# Patient Record
Sex: MALE | Race: ASIAN | NOT HISPANIC OR LATINO | URBAN - METROPOLITAN AREA
[De-identification: names, ages, dates, MRNs, and addresses within clinical notes are randomized per-mention and may not be internally consistent; named-entity substitution may affect disease eponyms.]

---

## 2021-01-01 ENCOUNTER — INPATIENT (INPATIENT)
Facility: HOSPITAL | Age: 86
LOS: 1 days | DRG: 871 | End: 2021-03-03
Attending: INTERNAL MEDICINE | Admitting: INTERNAL MEDICINE
Payer: MEDICARE

## 2021-01-01 VITALS
RESPIRATION RATE: 18 BRPM | HEART RATE: 136 BPM | OXYGEN SATURATION: 76 % | SYSTOLIC BLOOD PRESSURE: 118 MMHG | DIASTOLIC BLOOD PRESSURE: 80 MMHG | WEIGHT: 176.37 LBS

## 2021-01-01 VITALS — HEART RATE: 105 BPM | OXYGEN SATURATION: 100 %

## 2021-01-01 DIAGNOSIS — J96.90 RESPIRATORY FAILURE, UNSPECIFIED, UNSPECIFIED WHETHER WITH HYPOXIA OR HYPERCAPNIA: ICD-10-CM

## 2021-01-01 DIAGNOSIS — E43 UNSPECIFIED SEVERE PROTEIN-CALORIE MALNUTRITION: ICD-10-CM

## 2021-01-01 DIAGNOSIS — Z95.1 PRESENCE OF AORTOCORONARY BYPASS GRAFT: Chronic | ICD-10-CM

## 2021-01-01 DIAGNOSIS — I50.9 HEART FAILURE, UNSPECIFIED: ICD-10-CM

## 2021-01-01 DIAGNOSIS — U07.1 COVID-19: ICD-10-CM

## 2021-01-01 DIAGNOSIS — R53.81 OTHER MALAISE: ICD-10-CM

## 2021-01-01 DIAGNOSIS — Z51.5 ENCOUNTER FOR PALLIATIVE CARE: ICD-10-CM

## 2021-01-01 DIAGNOSIS — I63.9 CEREBRAL INFARCTION, UNSPECIFIED: ICD-10-CM

## 2021-01-01 DIAGNOSIS — A41.9 SEPSIS, UNSPECIFIED ORGANISM: ICD-10-CM

## 2021-01-01 LAB
24R-OH-CALCIDIOL SERPL-MCNC: 37.9 NG/ML — SIGNIFICANT CHANGE UP (ref 30–80)
A1C WITH ESTIMATED AVERAGE GLUCOSE RESULT: 6.2 % — HIGH (ref 4–5.6)
ALBUMIN SERPL ELPH-MCNC: 1.7 G/DL — LOW (ref 3.5–5)
ALBUMIN SERPL ELPH-MCNC: 1.8 G/DL — LOW (ref 3.5–5)
ALBUMIN SERPL ELPH-MCNC: 1.8 G/DL — LOW (ref 3.5–5)
ALBUMIN SERPL ELPH-MCNC: 2.3 G/DL — LOW (ref 3.5–5)
ALP SERPL-CCNC: 104 U/L — SIGNIFICANT CHANGE UP (ref 40–120)
ALP SERPL-CCNC: 110 U/L — SIGNIFICANT CHANGE UP (ref 40–120)
ALP SERPL-CCNC: 115 U/L — SIGNIFICANT CHANGE UP (ref 40–120)
ALP SERPL-CCNC: 122 U/L — HIGH (ref 40–120)
ALP SERPL-CCNC: 147 U/L — HIGH (ref 40–120)
ALP SERPL-CCNC: 99 U/L — SIGNIFICANT CHANGE UP (ref 40–120)
ALT FLD-CCNC: 179 U/L DA — HIGH (ref 10–60)
ALT FLD-CCNC: 392 U/L DA — HIGH (ref 10–60)
ALT FLD-CCNC: 570 U/L DA — HIGH (ref 10–60)
ALT FLD-CCNC: 596 U/L DA — HIGH (ref 10–60)
ALT FLD-CCNC: 671 U/L DA — HIGH (ref 10–60)
ALT FLD-CCNC: 684 U/L DA — HIGH (ref 10–60)
ANION GAP SERPL CALC-SCNC: 11 MMOL/L — SIGNIFICANT CHANGE UP (ref 5–17)
ANION GAP SERPL CALC-SCNC: 19 MMOL/L — HIGH (ref 5–17)
ANION GAP SERPL CALC-SCNC: 9 MMOL/L — SIGNIFICANT CHANGE UP (ref 5–17)
ANION GAP SERPL CALC-SCNC: 9 MMOL/L — SIGNIFICANT CHANGE UP (ref 5–17)
APPEARANCE UR: CLEAR — SIGNIFICANT CHANGE UP
APTT BLD: 32.6 SEC — SIGNIFICANT CHANGE UP (ref 27.5–35.5)
APTT BLD: >200 SEC — CRITICAL HIGH (ref 27.5–35.5)
AST SERPL-CCNC: 1013 U/L — HIGH (ref 10–40)
AST SERPL-CCNC: 1480 U/L — HIGH (ref 10–40)
AST SERPL-CCNC: 1499 U/L — HIGH (ref 10–40)
AST SERPL-CCNC: 1977 U/L — HIGH (ref 10–40)
AST SERPL-CCNC: 2000 U/L — HIGH (ref 10–40)
AST SERPL-CCNC: 506 U/L — HIGH (ref 10–40)
BASE EXCESS BLDA CALC-SCNC: -6.6 MMOL/L — LOW (ref -2–2)
BASE EXCESS BLDA CALC-SCNC: -7.4 MMOL/L — LOW (ref -2–2)
BASE EXCESS BLDA CALC-SCNC: -8.7 MMOL/L — LOW (ref -2–2)
BASE EXCESS BLDV CALC-SCNC: -5 MMOL/L — LOW (ref -2–2)
BASOPHILS # BLD AUTO: 0 K/UL — SIGNIFICANT CHANGE UP (ref 0–0.2)
BASOPHILS # BLD AUTO: 0.03 K/UL — SIGNIFICANT CHANGE UP (ref 0–0.2)
BASOPHILS # BLD AUTO: 0.04 K/UL — SIGNIFICANT CHANGE UP (ref 0–0.2)
BASOPHILS NFR BLD AUTO: 0 % — SIGNIFICANT CHANGE UP (ref 0–2)
BASOPHILS NFR BLD AUTO: 0.2 % — SIGNIFICANT CHANGE UP (ref 0–2)
BASOPHILS NFR BLD AUTO: 0.3 % — SIGNIFICANT CHANGE UP (ref 0–2)
BILIRUB DIRECT SERPL-MCNC: 0.9 MG/DL — HIGH (ref 0–0.2)
BILIRUB DIRECT SERPL-MCNC: 1.7 MG/DL — HIGH (ref 0–0.2)
BILIRUB INDIRECT FLD-MCNC: 0.7 MG/DL — SIGNIFICANT CHANGE UP (ref 0.2–1)
BILIRUB INDIRECT FLD-MCNC: 1.2 MG/DL — HIGH (ref 0.2–1)
BILIRUB SERPL-MCNC: 1.5 MG/DL — HIGH (ref 0.2–1.2)
BILIRUB SERPL-MCNC: 1.6 MG/DL — HIGH (ref 0.2–1.2)
BILIRUB SERPL-MCNC: 1.6 MG/DL — HIGH (ref 0.2–1.2)
BILIRUB SERPL-MCNC: 2 MG/DL — HIGH (ref 0.2–1.2)
BILIRUB SERPL-MCNC: 2.3 MG/DL — HIGH (ref 0.2–1.2)
BILIRUB SERPL-MCNC: 2.9 MG/DL — HIGH (ref 0.2–1.2)
BILIRUB SERPL-MCNC: 2.9 MG/DL — HIGH (ref 0.2–1.2)
BILIRUB SERPL-MCNC: 4.4 MG/DL — HIGH (ref 0.2–1.2)
BILIRUB UR-MCNC: NEGATIVE — SIGNIFICANT CHANGE UP
BLOOD GAS COMMENTS ARTERIAL: SIGNIFICANT CHANGE UP
BLOOD GAS COMMENTS, VENOUS: SIGNIFICANT CHANGE UP
BLOOD GAS COMMENTS, VENOUS: SIGNIFICANT CHANGE UP
BUN SERPL-MCNC: 69 MG/DL — HIGH (ref 7–18)
BUN SERPL-MCNC: 77 MG/DL — HIGH (ref 7–18)
BUN SERPL-MCNC: 81 MG/DL — HIGH (ref 7–18)
BUN SERPL-MCNC: 81 MG/DL — HIGH (ref 7–18)
BUN SERPL-MCNC: 91 MG/DL — HIGH (ref 7–18)
BUN SERPL-MCNC: 97 MG/DL — HIGH (ref 7–18)
CALCIUM SERPL-MCNC: 7.2 MG/DL — LOW (ref 8.4–10.5)
CALCIUM SERPL-MCNC: 7.3 MG/DL — LOW (ref 8.4–10.5)
CALCIUM SERPL-MCNC: 7.4 MG/DL — LOW (ref 8.4–10.5)
CALCIUM SERPL-MCNC: 7.5 MG/DL — LOW (ref 8.4–10.5)
CALCIUM SERPL-MCNC: 7.6 MG/DL — LOW (ref 8.4–10.5)
CALCIUM SERPL-MCNC: 8.6 MG/DL — SIGNIFICANT CHANGE UP (ref 8.4–10.5)
CHLORIDE SERPL-SCNC: 108 MMOL/L — SIGNIFICANT CHANGE UP (ref 96–108)
CHLORIDE SERPL-SCNC: 111 MMOL/L — HIGH (ref 96–108)
CHLORIDE SERPL-SCNC: 114 MMOL/L — HIGH (ref 96–108)
CHLORIDE SERPL-SCNC: 115 MMOL/L — HIGH (ref 96–108)
CHOLEST SERPL-MCNC: 67 MG/DL — SIGNIFICANT CHANGE UP
CK MB BLD-MCNC: 6.5 % — HIGH (ref 0–3.5)
CK MB BLD-MCNC: 6.9 % — HIGH (ref 0–3.5)
CK MB BLD-MCNC: 7.3 % — HIGH (ref 0–3.5)
CK MB CFR SERPL CALC: 102.2 NG/ML — HIGH (ref 0–3.6)
CK MB CFR SERPL CALC: 107.6 NG/ML — HIGH (ref 0–3.6)
CK MB CFR SERPL CALC: 97.7 NG/ML — HIGH (ref 0–3.6)
CK SERPL-CCNC: 1330 U/L — HIGH (ref 35–232)
CK SERPL-CCNC: 1489 U/L — HIGH (ref 35–232)
CK SERPL-CCNC: 1658 U/L — HIGH (ref 35–232)
CO2 SERPL-SCNC: 17 MMOL/L — LOW (ref 22–31)
CO2 SERPL-SCNC: 21 MMOL/L — LOW (ref 22–31)
COLOR SPEC: YELLOW — SIGNIFICANT CHANGE UP
CREAT ?TM UR-MCNC: 288 MG/DL — SIGNIFICANT CHANGE UP
CREAT SERPL-MCNC: 3.55 MG/DL — HIGH (ref 0.5–1.3)
CREAT SERPL-MCNC: 4.14 MG/DL — HIGH (ref 0.5–1.3)
CREAT SERPL-MCNC: 4.62 MG/DL — HIGH (ref 0.5–1.3)
CREAT SERPL-MCNC: 4.94 MG/DL — HIGH (ref 0.5–1.3)
CREAT SERPL-MCNC: 5.53 MG/DL — HIGH (ref 0.5–1.3)
CREAT SERPL-MCNC: 6.25 MG/DL — HIGH (ref 0.5–1.3)
CRP SERPL-MCNC: 187 MG/L — HIGH
CULTURE RESULTS: NO GROWTH — SIGNIFICANT CHANGE UP
D DIMER BLD IA.RAPID-MCNC: HIGH NG/ML DDU
D DIMER BLD IA.RAPID-MCNC: HIGH NG/ML DDU
DIFF PNL FLD: ABNORMAL
EOSINOPHIL # BLD AUTO: 0 K/UL — SIGNIFICANT CHANGE UP (ref 0–0.5)
EOSINOPHIL NFR BLD AUTO: 0 % — SIGNIFICANT CHANGE UP (ref 0–6)
ESTIMATED AVERAGE GLUCOSE: 131 MG/DL — HIGH (ref 68–114)
FERRITIN SERPL-MCNC: 2987 NG/ML — HIGH (ref 30–400)
FERRITIN SERPL-MCNC: 4479 NG/ML — HIGH (ref 30–400)
FIBRINOGEN PPP-MCNC: 217 MG/DL — LOW (ref 290–520)
FOLATE SERPL-MCNC: 14.9 NG/ML — SIGNIFICANT CHANGE UP
FSP PPP-MCNC: >=20 — SIGNIFICANT CHANGE UP
GLUCOSE SERPL-MCNC: 105 MG/DL — HIGH (ref 70–99)
GLUCOSE SERPL-MCNC: 143 MG/DL — HIGH (ref 70–99)
GLUCOSE SERPL-MCNC: 157 MG/DL — HIGH (ref 70–99)
GLUCOSE SERPL-MCNC: 158 MG/DL — HIGH (ref 70–99)
GLUCOSE SERPL-MCNC: 158 MG/DL — HIGH (ref 70–99)
GLUCOSE SERPL-MCNC: 218 MG/DL — HIGH (ref 70–99)
GLUCOSE UR QL: NEGATIVE — SIGNIFICANT CHANGE UP
HAV IGM SER-ACNC: SIGNIFICANT CHANGE UP
HBV CORE IGM SER-ACNC: SIGNIFICANT CHANGE UP
HBV SURFACE AG SER-ACNC: SIGNIFICANT CHANGE UP
HCO3 BLDA-SCNC: 14 MMOL/L — LOW (ref 23–27)
HCO3 BLDA-SCNC: 15 MMOL/L — LOW (ref 23–27)
HCO3 BLDA-SCNC: 18 MMOL/L — LOW (ref 23–27)
HCO3 BLDV-SCNC: 21 MMOL/L — SIGNIFICANT CHANGE UP (ref 21–29)
HCT VFR BLD CALC: 39.5 % — SIGNIFICANT CHANGE UP (ref 39–50)
HCT VFR BLD CALC: 40.4 % — SIGNIFICANT CHANGE UP (ref 39–50)
HCT VFR BLD CALC: 41.2 % — SIGNIFICANT CHANGE UP (ref 39–50)
HCT VFR BLD CALC: 41.6 % — SIGNIFICANT CHANGE UP (ref 39–50)
HCT VFR BLD CALC: 42.6 % — SIGNIFICANT CHANGE UP (ref 39–50)
HCT VFR BLD CALC: 47.9 % — SIGNIFICANT CHANGE UP (ref 39–50)
HCV AB S/CO SERPL IA: 0.14 S/CO — SIGNIFICANT CHANGE UP (ref 0–0.99)
HCV AB SERPL-IMP: SIGNIFICANT CHANGE UP
HDLC SERPL-MCNC: 20 MG/DL — LOW
HGB BLD-MCNC: 13 G/DL — SIGNIFICANT CHANGE UP (ref 13–17)
HGB BLD-MCNC: 13.1 G/DL — SIGNIFICANT CHANGE UP (ref 13–17)
HGB BLD-MCNC: 13.4 G/DL — SIGNIFICANT CHANGE UP (ref 13–17)
HGB BLD-MCNC: 13.5 G/DL — SIGNIFICANT CHANGE UP (ref 13–17)
HGB BLD-MCNC: 13.8 G/DL — SIGNIFICANT CHANGE UP (ref 13–17)
HGB BLD-MCNC: 16 G/DL — SIGNIFICANT CHANGE UP (ref 13–17)
HIV 1 & 2 AB SERPL IA.RAPID: SIGNIFICANT CHANGE UP
HOROWITZ INDEX BLDA+IHG-RTO: 100 — SIGNIFICANT CHANGE UP
HOROWITZ INDEX BLDV+IHG-RTO: 100 — SIGNIFICANT CHANGE UP
IMM GRANULOCYTES NFR BLD AUTO: 2.2 % — HIGH (ref 0–1.5)
IMM GRANULOCYTES NFR BLD AUTO: 2.4 % — HIGH (ref 0–1.5)
INR BLD: 1.28 RATIO — HIGH (ref 0.88–1.16)
INR BLD: 1.71 RATIO — HIGH (ref 0.88–1.16)
KETONES UR-MCNC: ABNORMAL
LACTATE SERPL-SCNC: 10.8 MMOL/L — CRITICAL HIGH (ref 0.7–2)
LACTATE SERPL-SCNC: 2.4 MMOL/L — HIGH (ref 0.7–2)
LACTATE SERPL-SCNC: 3 MMOL/L — HIGH (ref 0.7–2)
LACTATE SERPL-SCNC: 4 MMOL/L — CRITICAL HIGH (ref 0.7–2)
LACTATE SERPL-SCNC: 4.2 MMOL/L — CRITICAL HIGH (ref 0.7–2)
LACTATE SERPL-SCNC: 5.3 MMOL/L — CRITICAL HIGH (ref 0.7–2)
LEUKOCYTE ESTERASE UR-ACNC: ABNORMAL
LIPID PNL WITH DIRECT LDL SERPL: 16 MG/DL — SIGNIFICANT CHANGE UP
LYMPHOCYTES # BLD AUTO: 0.1 K/UL — LOW (ref 1–3.3)
LYMPHOCYTES # BLD AUTO: 1 % — LOW (ref 13–44)
LYMPHOCYTES # BLD AUTO: 1.07 K/UL — SIGNIFICANT CHANGE UP (ref 1–3.3)
LYMPHOCYTES # BLD AUTO: 1.3 K/UL — SIGNIFICANT CHANGE UP (ref 1–3.3)
LYMPHOCYTES # BLD AUTO: 10.2 % — LOW (ref 13–44)
LYMPHOCYTES # BLD AUTO: 7 % — LOW (ref 13–44)
MAGNESIUM SERPL-MCNC: 3 MG/DL — HIGH (ref 1.6–2.6)
MAGNESIUM SERPL-MCNC: 3.1 MG/DL — HIGH (ref 1.6–2.6)
MCHC RBC-ENTMCNC: 30.5 PG — SIGNIFICANT CHANGE UP (ref 27–34)
MCHC RBC-ENTMCNC: 30.7 PG — SIGNIFICANT CHANGE UP (ref 27–34)
MCHC RBC-ENTMCNC: 30.7 PG — SIGNIFICANT CHANGE UP (ref 27–34)
MCHC RBC-ENTMCNC: 30.8 PG — SIGNIFICANT CHANGE UP (ref 27–34)
MCHC RBC-ENTMCNC: 31.4 PG — SIGNIFICANT CHANGE UP (ref 27–34)
MCHC RBC-ENTMCNC: 32 PG — SIGNIFICANT CHANGE UP (ref 27–34)
MCHC RBC-ENTMCNC: 32.2 GM/DL — SIGNIFICANT CHANGE UP (ref 32–36)
MCHC RBC-ENTMCNC: 32.4 GM/DL — SIGNIFICANT CHANGE UP (ref 32–36)
MCHC RBC-ENTMCNC: 32.5 GM/DL — SIGNIFICANT CHANGE UP (ref 32–36)
MCHC RBC-ENTMCNC: 32.5 GM/DL — SIGNIFICANT CHANGE UP (ref 32–36)
MCHC RBC-ENTMCNC: 33.2 GM/DL — SIGNIFICANT CHANGE UP (ref 32–36)
MCHC RBC-ENTMCNC: 33.4 GM/DL — SIGNIFICANT CHANGE UP (ref 32–36)
MCV RBC AUTO: 92.3 FL — SIGNIFICANT CHANGE UP (ref 80–100)
MCV RBC AUTO: 94.1 FL — SIGNIFICANT CHANGE UP (ref 80–100)
MCV RBC AUTO: 94.7 FL — SIGNIFICANT CHANGE UP (ref 80–100)
MCV RBC AUTO: 95.3 FL — SIGNIFICANT CHANGE UP (ref 80–100)
MCV RBC AUTO: 96.3 FL — SIGNIFICANT CHANGE UP (ref 80–100)
MCV RBC AUTO: 96.5 FL — SIGNIFICANT CHANGE UP (ref 80–100)
MONOCYTES # BLD AUTO: 0.3 K/UL — SIGNIFICANT CHANGE UP (ref 0–0.9)
MONOCYTES # BLD AUTO: 0.55 K/UL — SIGNIFICANT CHANGE UP (ref 0–0.9)
MONOCYTES # BLD AUTO: 0.72 K/UL — SIGNIFICANT CHANGE UP (ref 0–0.9)
MONOCYTES NFR BLD AUTO: 2 % — SIGNIFICANT CHANGE UP (ref 2–14)
MONOCYTES NFR BLD AUTO: 4.3 % — SIGNIFICANT CHANGE UP (ref 2–14)
MONOCYTES NFR BLD AUTO: 7 % — SIGNIFICANT CHANGE UP (ref 2–14)
MRSA PCR RESULT.: SIGNIFICANT CHANGE UP
NEUTROPHILS # BLD AUTO: 10.55 K/UL — HIGH (ref 1.8–7.4)
NEUTROPHILS # BLD AUTO: 13.49 K/UL — HIGH (ref 1.8–7.4)
NEUTROPHILS # BLD AUTO: 8.99 K/UL — HIGH (ref 1.8–7.4)
NEUTROPHILS NFR BLD AUTO: 83.1 % — HIGH (ref 43–77)
NEUTROPHILS NFR BLD AUTO: 88 % — HIGH (ref 43–77)
NEUTROPHILS NFR BLD AUTO: 88.3 % — HIGH (ref 43–77)
NITRITE UR-MCNC: POSITIVE
NON HDL CHOLESTEROL: 47 MG/DL — SIGNIFICANT CHANGE UP
NRBC # BLD: 0 /100 WBCS — SIGNIFICANT CHANGE UP (ref 0–0)
OSMOLALITY UR: 823 MOS/KG — SIGNIFICANT CHANGE UP (ref 50–1200)
PCO2 BLDA: 21 MMHG — LOW (ref 32–46)
PCO2 BLDA: 27 MMHG — LOW (ref 32–46)
PCO2 BLDA: 32 MMHG — SIGNIFICANT CHANGE UP (ref 32–46)
PCO2 BLDV: 46 MMHG — SIGNIFICANT CHANGE UP (ref 35–50)
PH BLDA: 7.35 — SIGNIFICANT CHANGE UP (ref 7.35–7.45)
PH BLDA: 7.36 — SIGNIFICANT CHANGE UP (ref 7.35–7.45)
PH BLDA: 7.44 — SIGNIFICANT CHANGE UP (ref 7.35–7.45)
PH BLDV: 7.29 — LOW (ref 7.35–7.45)
PH UR: 5 — SIGNIFICANT CHANGE UP (ref 5–8)
PHOSPHATE SERPL-MCNC: 5.2 MG/DL — HIGH (ref 2.5–4.5)
PHOSPHATE SERPL-MCNC: 5.5 MG/DL — HIGH (ref 2.5–4.5)
PHOSPHATE SERPL-MCNC: 5.6 MG/DL — HIGH (ref 2.5–4.5)
PHOSPHATE SERPL-MCNC: 6.3 MG/DL — HIGH (ref 2.5–4.5)
PLATELET # BLD AUTO: 25 K/UL — LOW (ref 150–400)
PLATELET # BLD AUTO: 31 K/UL — LOW (ref 150–400)
PLATELET # BLD AUTO: 33 K/UL — LOW (ref 150–400)
PLATELET # BLD AUTO: 38 K/UL — LOW (ref 150–400)
PLATELET # BLD AUTO: 45 K/UL — LOW (ref 150–400)
PLATELET # BLD AUTO: 50 K/UL — LOW (ref 150–400)
PO2 BLDA: 122 MMHG — HIGH (ref 74–108)
PO2 BLDA: 341 MMHG — HIGH (ref 74–108)
PO2 BLDA: 64 MMHG — LOW (ref 74–108)
PO2 BLDV: 35 MMHG — SIGNIFICANT CHANGE UP (ref 25–45)
POTASSIUM SERPL-MCNC: 5.4 MMOL/L — HIGH (ref 3.5–5.3)
POTASSIUM SERPL-MCNC: 5.5 MMOL/L — HIGH (ref 3.5–5.3)
POTASSIUM SERPL-MCNC: 5.7 MMOL/L — HIGH (ref 3.5–5.3)
POTASSIUM SERPL-MCNC: 5.9 MMOL/L — HIGH (ref 3.5–5.3)
POTASSIUM SERPL-MCNC: 5.9 MMOL/L — HIGH (ref 3.5–5.3)
POTASSIUM SERPL-MCNC: 6 MMOL/L — HIGH (ref 3.5–5.3)
POTASSIUM SERPL-SCNC: 5.4 MMOL/L — HIGH (ref 3.5–5.3)
POTASSIUM SERPL-SCNC: 5.5 MMOL/L — HIGH (ref 3.5–5.3)
POTASSIUM SERPL-SCNC: 5.7 MMOL/L — HIGH (ref 3.5–5.3)
POTASSIUM SERPL-SCNC: 5.9 MMOL/L — HIGH (ref 3.5–5.3)
POTASSIUM SERPL-SCNC: 5.9 MMOL/L — HIGH (ref 3.5–5.3)
POTASSIUM SERPL-SCNC: 6 MMOL/L — HIGH (ref 3.5–5.3)
PROCALCITONIN SERPL-MCNC: 4.04 NG/ML — HIGH (ref 0.02–0.1)
PROCALCITONIN SERPL-MCNC: 7.69 NG/ML — HIGH (ref 0.02–0.1)
PROT ?TM UR-MCNC: 230 MG/DL — HIGH (ref 0–12)
PROT SERPL-MCNC: 5.6 G/DL — LOW (ref 6–8.3)
PROT SERPL-MCNC: 5.6 G/DL — LOW (ref 6–8.3)
PROT SERPL-MCNC: 5.7 G/DL — LOW (ref 6–8.3)
PROT SERPL-MCNC: 5.8 G/DL — LOW (ref 6–8.3)
PROT SERPL-MCNC: 5.9 G/DL — LOW (ref 6–8.3)
PROT SERPL-MCNC: 6.9 G/DL — SIGNIFICANT CHANGE UP (ref 6–8.3)
PROT UR-MCNC: 100
PROTHROM AB SERPL-ACNC: 15.1 SEC — HIGH (ref 10.6–13.6)
PROTHROM AB SERPL-ACNC: 19.8 SEC — HIGH (ref 10.6–13.6)
RBC # BLD: 4.1 M/UL — LOW (ref 4.2–5.8)
RBC # BLD: 4.24 M/UL — SIGNIFICANT CHANGE UP (ref 4.2–5.8)
RBC # BLD: 4.27 M/UL — SIGNIFICANT CHANGE UP (ref 4.2–5.8)
RBC # BLD: 4.42 M/UL — SIGNIFICANT CHANGE UP (ref 4.2–5.8)
RBC # BLD: 4.5 M/UL — SIGNIFICANT CHANGE UP (ref 4.2–5.8)
RBC # BLD: 5.19 M/UL — SIGNIFICANT CHANGE UP (ref 4.2–5.8)
RBC # FLD: 12.8 % — SIGNIFICANT CHANGE UP (ref 10.3–14.5)
RBC # FLD: 13.1 % — SIGNIFICANT CHANGE UP (ref 10.3–14.5)
RBC # FLD: 13.3 % — SIGNIFICANT CHANGE UP (ref 10.3–14.5)
RBC # FLD: 13.6 % — SIGNIFICANT CHANGE UP (ref 10.3–14.5)
RBC # FLD: 13.9 % — SIGNIFICANT CHANGE UP (ref 10.3–14.5)
RBC # FLD: 14.1 % — SIGNIFICANT CHANGE UP (ref 10.3–14.5)
S AUREUS DNA NOSE QL NAA+PROBE: SIGNIFICANT CHANGE UP
SAO2 % BLDA: 92 % — SIGNIFICANT CHANGE UP (ref 92–96)
SAO2 % BLDA: 98 % — HIGH (ref 92–96)
SAO2 % BLDA: 99 % — HIGH (ref 92–96)
SAO2 % BLDV: 50 % — LOW (ref 67–88)
SARS-COV-2 RNA SPEC QL NAA+PROBE: DETECTED
SODIUM SERPL-SCNC: 143 MMOL/L — SIGNIFICANT CHANGE UP (ref 135–145)
SODIUM SERPL-SCNC: 144 MMOL/L — SIGNIFICANT CHANGE UP (ref 135–145)
SODIUM SERPL-SCNC: 144 MMOL/L — SIGNIFICANT CHANGE UP (ref 135–145)
SODIUM SERPL-SCNC: 145 MMOL/L — SIGNIFICANT CHANGE UP (ref 135–145)
SODIUM SERPL-SCNC: 146 MMOL/L — HIGH (ref 135–145)
SODIUM SERPL-SCNC: 146 MMOL/L — HIGH (ref 135–145)
SODIUM UR-SCNC: <5 MMOL/L — SIGNIFICANT CHANGE UP
SP GR SPEC: 1.01 — SIGNIFICANT CHANGE UP (ref 1.01–1.02)
SPECIMEN SOURCE: SIGNIFICANT CHANGE UP
TRIGL SERPL-MCNC: 156 MG/DL — HIGH
TROPONIN I SERPL-MCNC: 110 NG/ML — HIGH (ref 0–0.04)
TROPONIN I SERPL-MCNC: 111 NG/ML — HIGH (ref 0–0.04)
TROPONIN I SERPL-MCNC: 111 NG/ML — HIGH (ref 0–0.04)
TROPONIN I SERPL-MCNC: 115 NG/ML — SIGNIFICANT CHANGE UP (ref 0–0.04)
TROPONIN I SERPL-MCNC: 90.9 NG/ML — HIGH (ref 0–0.04)
UROBILINOGEN FLD QL: 1
VIT B12 SERPL-MCNC: >2000 PG/ML — HIGH (ref 232–1245)
WBC # BLD: 10.22 K/UL — SIGNIFICANT CHANGE UP (ref 3.8–10.5)
WBC # BLD: 12.48 K/UL — HIGH (ref 3.8–10.5)
WBC # BLD: 14.67 K/UL — HIGH (ref 3.8–10.5)
WBC # BLD: 14.94 K/UL — HIGH (ref 3.8–10.5)
WBC # BLD: 15.26 K/UL — HIGH (ref 3.8–10.5)
WBC # BLD: 18.69 K/UL — HIGH (ref 3.8–10.5)
WBC # FLD AUTO: 10.22 K/UL — SIGNIFICANT CHANGE UP (ref 3.8–10.5)
WBC # FLD AUTO: 12.48 K/UL — HIGH (ref 3.8–10.5)
WBC # FLD AUTO: 14.67 K/UL — HIGH (ref 3.8–10.5)
WBC # FLD AUTO: 14.94 K/UL — HIGH (ref 3.8–10.5)
WBC # FLD AUTO: 15.26 K/UL — HIGH (ref 3.8–10.5)
WBC # FLD AUTO: 18.69 K/UL — HIGH (ref 3.8–10.5)

## 2021-01-01 PROCEDURE — 99223 1ST HOSP IP/OBS HIGH 75: CPT | Mod: CS

## 2021-01-01 PROCEDURE — 71250 CT THORAX DX C-: CPT | Mod: 26

## 2021-01-01 PROCEDURE — 99291 CRITICAL CARE FIRST HOUR: CPT | Mod: CS

## 2021-01-01 PROCEDURE — 99291 CRITICAL CARE FIRST HOUR: CPT | Mod: CS,25

## 2021-01-01 PROCEDURE — 31500 INSERT EMERGENCY AIRWAY: CPT

## 2021-01-01 PROCEDURE — 71045 X-RAY EXAM CHEST 1 VIEW: CPT | Mod: 26,77

## 2021-01-01 PROCEDURE — 71045 X-RAY EXAM CHEST 1 VIEW: CPT | Mod: 26

## 2021-01-01 PROCEDURE — 76705 ECHO EXAM OF ABDOMEN: CPT | Mod: 26

## 2021-01-01 PROCEDURE — 93970 EXTREMITY STUDY: CPT | Mod: 26

## 2021-01-01 PROCEDURE — 70450 CT HEAD/BRAIN W/O DYE: CPT | Mod: 26

## 2021-01-01 PROCEDURE — 99497 ADVNCD CARE PLAN 30 MIN: CPT | Mod: CS,25

## 2021-01-01 RX ORDER — AZITHROMYCIN 500 MG/1
500 TABLET, FILM COATED ORAL EVERY 24 HOURS
Refills: 0 | Status: DISCONTINUED | OUTPATIENT
Start: 2021-01-01 | End: 2021-01-01

## 2021-01-01 RX ORDER — DUTASTERIDE 0.5 MG/1
1 CAPSULE, LIQUID FILLED ORAL
Qty: 0 | Refills: 0 | DISCHARGE

## 2021-01-01 RX ORDER — HEPARIN SODIUM 5000 [USP'U]/ML
INJECTION INTRAVENOUS; SUBCUTANEOUS
Qty: 25000 | Refills: 0 | Status: DISCONTINUED | OUTPATIENT
Start: 2021-01-01 | End: 2021-01-01

## 2021-01-01 RX ORDER — CARVEDILOL PHOSPHATE 80 MG/1
1 CAPSULE, EXTENDED RELEASE ORAL
Qty: 0 | Refills: 0 | DISCHARGE

## 2021-01-01 RX ORDER — CALCIUM GLUCONATE 100 MG/ML
1 VIAL (ML) INTRAVENOUS ONCE
Refills: 0 | Status: COMPLETED | OUTPATIENT
Start: 2021-01-01 | End: 2021-01-01

## 2021-01-01 RX ORDER — TAMSULOSIN HYDROCHLORIDE 0.4 MG/1
1 CAPSULE ORAL
Qty: 0 | Refills: 0 | DISCHARGE

## 2021-01-01 RX ORDER — ACETAMINOPHEN 500 MG
650 TABLET ORAL ONCE
Refills: 0 | Status: COMPLETED | OUTPATIENT
Start: 2021-01-01 | End: 2021-01-01

## 2021-01-01 RX ORDER — PROPOFOL 10 MG/ML
10 INJECTION, EMULSION INTRAVENOUS
Qty: 1000 | Refills: 0 | Status: DISCONTINUED | OUTPATIENT
Start: 2021-01-01 | End: 2021-01-01

## 2021-01-01 RX ORDER — HYDROMORPHONE HYDROCHLORIDE 2 MG/ML
0.5 INJECTION INTRAMUSCULAR; INTRAVENOUS; SUBCUTANEOUS EVERY 4 HOURS
Refills: 0 | Status: DISCONTINUED | OUTPATIENT
Start: 2021-01-01 | End: 2021-01-01

## 2021-01-01 RX ORDER — PROPOFOL 10 MG/ML
5 INJECTION, EMULSION INTRAVENOUS
Qty: 500 | Refills: 0 | Status: DISCONTINUED | OUTPATIENT
Start: 2021-01-01 | End: 2021-01-01

## 2021-01-01 RX ORDER — INSULIN HUMAN 100 [IU]/ML
5 INJECTION, SOLUTION SUBCUTANEOUS ONCE
Refills: 0 | Status: COMPLETED | OUTPATIENT
Start: 2021-01-01 | End: 2021-01-01

## 2021-01-01 RX ORDER — CHLORHEXIDINE GLUCONATE 213 G/1000ML
1 SOLUTION TOPICAL
Refills: 0 | Status: DISCONTINUED | OUTPATIENT
Start: 2021-01-01 | End: 2021-01-01

## 2021-01-01 RX ORDER — VANCOMYCIN HCL 1 G
1000 VIAL (EA) INTRAVENOUS ONCE
Refills: 0 | Status: COMPLETED | OUTPATIENT
Start: 2021-01-01 | End: 2021-01-01

## 2021-01-01 RX ORDER — SODIUM CHLORIDE 9 MG/ML
2500 INJECTION INTRAMUSCULAR; INTRAVENOUS; SUBCUTANEOUS ONCE
Refills: 0 | Status: COMPLETED | OUTPATIENT
Start: 2021-01-01 | End: 2021-01-01

## 2021-01-01 RX ORDER — PIPERACILLIN AND TAZOBACTAM 4; .5 G/20ML; G/20ML
3.38 INJECTION, POWDER, LYOPHILIZED, FOR SOLUTION INTRAVENOUS EVERY 12 HOURS
Refills: 0 | Status: DISCONTINUED | OUTPATIENT
Start: 2021-01-01 | End: 2021-01-01

## 2021-01-01 RX ORDER — SODIUM CHLORIDE 9 MG/ML
1000 INJECTION INTRAMUSCULAR; INTRAVENOUS; SUBCUTANEOUS ONCE
Refills: 0 | Status: COMPLETED | OUTPATIENT
Start: 2021-01-01 | End: 2021-01-01

## 2021-01-01 RX ORDER — HEPARIN SODIUM 5000 [USP'U]/ML
3000 INJECTION INTRAVENOUS; SUBCUTANEOUS EVERY 6 HOURS
Refills: 0 | Status: DISCONTINUED | OUTPATIENT
Start: 2021-01-01 | End: 2021-01-01

## 2021-01-01 RX ORDER — PANTOPRAZOLE SODIUM 20 MG/1
40 TABLET, DELAYED RELEASE ORAL DAILY
Refills: 0 | Status: DISCONTINUED | OUTPATIENT
Start: 2021-01-01 | End: 2021-01-01

## 2021-01-01 RX ORDER — ASPIRIN/CALCIUM CARB/MAGNESIUM 324 MG
81 TABLET ORAL DAILY
Refills: 0 | Status: DISCONTINUED | OUTPATIENT
Start: 2021-01-01 | End: 2021-01-01

## 2021-01-01 RX ORDER — CLOPIDOGREL BISULFATE 75 MG/1
75 TABLET, FILM COATED ORAL DAILY
Refills: 0 | Status: DISCONTINUED | OUTPATIENT
Start: 2021-01-01 | End: 2021-01-01

## 2021-01-01 RX ORDER — MONTELUKAST 4 MG/1
1 TABLET, CHEWABLE ORAL
Qty: 0 | Refills: 0 | DISCHARGE

## 2021-01-01 RX ORDER — DEXTROSE 50 % IN WATER 50 %
50 SYRINGE (ML) INTRAVENOUS ONCE
Refills: 0 | Status: COMPLETED | OUTPATIENT
Start: 2021-01-01 | End: 2021-01-01

## 2021-01-01 RX ORDER — SODIUM CHLORIDE 9 MG/ML
1000 INJECTION INTRAMUSCULAR; INTRAVENOUS; SUBCUTANEOUS
Refills: 0 | Status: DISCONTINUED | OUTPATIENT
Start: 2021-01-01 | End: 2021-01-01

## 2021-01-01 RX ORDER — SODIUM ZIRCONIUM CYCLOSILICATE 10 G/10G
10 POWDER, FOR SUSPENSION ORAL EVERY 8 HOURS
Refills: 0 | Status: DISCONTINUED | OUTPATIENT
Start: 2021-01-01 | End: 2021-01-01

## 2021-01-01 RX ORDER — ATORVASTATIN CALCIUM 80 MG/1
10 TABLET, FILM COATED ORAL AT BEDTIME
Refills: 0 | Status: DISCONTINUED | OUTPATIENT
Start: 2021-01-01 | End: 2021-01-01

## 2021-01-01 RX ORDER — CHLORHEXIDINE GLUCONATE 213 G/1000ML
15 SOLUTION TOPICAL EVERY 12 HOURS
Refills: 0 | Status: DISCONTINUED | OUTPATIENT
Start: 2021-01-01 | End: 2021-01-01

## 2021-01-01 RX ORDER — MIDAZOLAM HYDROCHLORIDE 1 MG/ML
2 INJECTION, SOLUTION INTRAMUSCULAR; INTRAVENOUS EVERY 6 HOURS
Refills: 0 | Status: DISCONTINUED | OUTPATIENT
Start: 2021-01-01 | End: 2021-01-01

## 2021-01-01 RX ORDER — ALBUTEROL 90 UG/1
2 AEROSOL, METERED ORAL
Refills: 0 | Status: COMPLETED | OUTPATIENT
Start: 2021-01-01 | End: 2021-01-01

## 2021-01-01 RX ORDER — ICOSAPENT ETHYL 500 MG/1
0 CAPSULE, LIQUID FILLED ORAL
Qty: 0 | Refills: 0 | DISCHARGE

## 2021-01-01 RX ORDER — CHLORHEXIDINE GLUCONATE 213 G/1000ML
1 SOLUTION TOPICAL EVERY 12 HOURS
Refills: 0 | Status: COMPLETED | OUTPATIENT
Start: 2021-01-01 | End: 2021-01-01

## 2021-01-01 RX ORDER — SODIUM CHLORIDE 9 MG/ML
10 INJECTION INTRAMUSCULAR; INTRAVENOUS; SUBCUTANEOUS
Refills: 0 | Status: DISCONTINUED | OUTPATIENT
Start: 2021-01-01 | End: 2021-01-01

## 2021-01-01 RX ORDER — HEPARIN SODIUM 5000 [USP'U]/ML
5000 INJECTION INTRAVENOUS; SUBCUTANEOUS EVERY 8 HOURS
Refills: 0 | Status: DISCONTINUED | OUTPATIENT
Start: 2021-01-01 | End: 2021-01-01

## 2021-01-01 RX ORDER — ATORVASTATIN CALCIUM 80 MG/1
1 TABLET, FILM COATED ORAL
Qty: 0 | Refills: 0 | DISCHARGE

## 2021-01-01 RX ORDER — CEFTRIAXONE 500 MG/1
1000 INJECTION, POWDER, FOR SOLUTION INTRAMUSCULAR; INTRAVENOUS EVERY 24 HOURS
Refills: 0 | Status: DISCONTINUED | OUTPATIENT
Start: 2021-01-01 | End: 2021-01-01

## 2021-01-01 RX ORDER — PHENYLEPHRINE HYDROCHLORIDE 10 MG/ML
0.2 INJECTION INTRAVENOUS
Qty: 40 | Refills: 0 | Status: DISCONTINUED | OUTPATIENT
Start: 2021-01-01 | End: 2021-01-01

## 2021-01-01 RX ORDER — HEPARIN SODIUM 5000 [USP'U]/ML
6500 INJECTION INTRAVENOUS; SUBCUTANEOUS ONCE
Refills: 0 | Status: COMPLETED | OUTPATIENT
Start: 2021-01-01 | End: 2021-01-01

## 2021-01-01 RX ORDER — HEPARIN SODIUM 5000 [USP'U]/ML
6500 INJECTION INTRAVENOUS; SUBCUTANEOUS EVERY 6 HOURS
Refills: 0 | Status: DISCONTINUED | OUTPATIENT
Start: 2021-01-01 | End: 2021-01-01

## 2021-01-01 RX ORDER — DEXAMETHASONE 0.5 MG/5ML
6 ELIXIR ORAL DAILY
Refills: 0 | Status: DISCONTINUED | OUTPATIENT
Start: 2021-01-01 | End: 2021-01-01

## 2021-01-01 RX ORDER — PIPERACILLIN AND TAZOBACTAM 4; .5 G/20ML; G/20ML
3.38 INJECTION, POWDER, LYOPHILIZED, FOR SOLUTION INTRAVENOUS ONCE
Refills: 0 | Status: COMPLETED | OUTPATIENT
Start: 2021-01-01 | End: 2021-01-01

## 2021-01-01 RX ORDER — ASPIRIN/CALCIUM CARB/MAGNESIUM 324 MG
1 TABLET ORAL
Qty: 0 | Refills: 0 | DISCHARGE

## 2021-01-01 RX ORDER — CLOPIDOGREL BISULFATE 75 MG/1
1 TABLET, FILM COATED ORAL
Qty: 0 | Refills: 0 | DISCHARGE

## 2021-01-01 RX ORDER — OLMESARTAN MEDOXOMIL 5 MG/1
1 TABLET, FILM COATED ORAL
Qty: 0 | Refills: 0 | DISCHARGE

## 2021-01-01 RX ORDER — DONEPEZIL HYDROCHLORIDE 10 MG/1
1 TABLET, FILM COATED ORAL
Qty: 0 | Refills: 0 | DISCHARGE

## 2021-01-01 RX ORDER — MIDAZOLAM HYDROCHLORIDE 1 MG/ML
2 INJECTION, SOLUTION INTRAMUSCULAR; INTRAVENOUS ONCE
Refills: 0 | Status: DISCONTINUED | OUTPATIENT
Start: 2021-01-01 | End: 2021-01-01

## 2021-01-01 RX ORDER — NOREPINEPHRINE BITARTRATE/D5W 8 MG/250ML
0.5 PLASTIC BAG, INJECTION (ML) INTRAVENOUS
Qty: 16 | Refills: 0 | Status: DISCONTINUED | OUTPATIENT
Start: 2021-01-01 | End: 2021-01-01

## 2021-01-01 RX ORDER — PROPOFOL 10 MG/ML
10.01 INJECTION, EMULSION INTRAVENOUS
Qty: 1000 | Refills: 0 | Status: DISCONTINUED | OUTPATIENT
Start: 2021-01-01 | End: 2021-01-01

## 2021-01-01 RX ORDER — ATORVASTATIN CALCIUM 80 MG/1
40 TABLET, FILM COATED ORAL AT BEDTIME
Refills: 0 | Status: DISCONTINUED | OUTPATIENT
Start: 2021-01-01 | End: 2021-01-01

## 2021-01-01 RX ORDER — ACETAMINOPHEN 500 MG
650 TABLET ORAL EVERY 6 HOURS
Refills: 0 | Status: COMPLETED | OUTPATIENT
Start: 2021-01-01 | End: 2021-01-01

## 2021-01-01 RX ADMIN — HEPARIN SODIUM 5000 UNIT(S): 5000 INJECTION INTRAVENOUS; SUBCUTANEOUS at 21:29

## 2021-01-01 RX ADMIN — PROPOFOL 3.86 MICROGRAM(S)/KG/MIN: 10 INJECTION, EMULSION INTRAVENOUS at 21:28

## 2021-01-01 RX ADMIN — PIPERACILLIN AND TAZOBACTAM 25 GRAM(S): 4; .5 INJECTION, POWDER, LYOPHILIZED, FOR SOLUTION INTRAVENOUS at 05:07

## 2021-01-01 RX ADMIN — PIPERACILLIN AND TAZOBACTAM 25 GRAM(S): 4; .5 INJECTION, POWDER, LYOPHILIZED, FOR SOLUTION INTRAVENOUS at 06:15

## 2021-01-01 RX ADMIN — ALBUTEROL 2 PUFF(S): 90 AEROSOL, METERED ORAL at 21:49

## 2021-01-01 RX ADMIN — CHLORHEXIDINE GLUCONATE 1 APPLICATION(S): 213 SOLUTION TOPICAL at 05:07

## 2021-01-01 RX ADMIN — Medication 6 MILLIGRAM(S): at 18:39

## 2021-01-01 RX ADMIN — ALBUTEROL 2 PUFF(S): 90 AEROSOL, METERED ORAL at 21:15

## 2021-01-01 RX ADMIN — PROPOFOL 3.86 MICROGRAM(S)/KG/MIN: 10 INJECTION, EMULSION INTRAVENOUS at 10:59

## 2021-01-01 RX ADMIN — PIPERACILLIN AND TAZOBACTAM 25 GRAM(S): 4; .5 INJECTION, POWDER, LYOPHILIZED, FOR SOLUTION INTRAVENOUS at 18:36

## 2021-01-01 RX ADMIN — HEPARIN SODIUM 5000 UNIT(S): 5000 INJECTION INTRAVENOUS; SUBCUTANEOUS at 14:00

## 2021-01-01 RX ADMIN — CHLORHEXIDINE GLUCONATE 15 MILLILITER(S): 213 SOLUTION TOPICAL at 17:40

## 2021-01-01 RX ADMIN — HEPARIN SODIUM 1400 UNIT(S)/HR: 5000 INJECTION INTRAVENOUS; SUBCUTANEOUS at 13:19

## 2021-01-01 RX ADMIN — SODIUM ZIRCONIUM CYCLOSILICATE 10 GRAM(S): 10 POWDER, FOR SUSPENSION ORAL at 21:29

## 2021-01-01 RX ADMIN — CHLORHEXIDINE GLUCONATE 1 APPLICATION(S): 213 SOLUTION TOPICAL at 18:36

## 2021-01-01 RX ADMIN — PIPERACILLIN AND TAZOBACTAM 25 GRAM(S): 4; .5 INJECTION, POWDER, LYOPHILIZED, FOR SOLUTION INTRAVENOUS at 18:00

## 2021-01-01 RX ADMIN — HEPARIN SODIUM 5000 UNIT(S): 5000 INJECTION INTRAVENOUS; SUBCUTANEOUS at 21:48

## 2021-01-01 RX ADMIN — HEPARIN SODIUM 5000 UNIT(S): 5000 INJECTION INTRAVENOUS; SUBCUTANEOUS at 06:14

## 2021-01-01 RX ADMIN — HEPARIN SODIUM 5000 UNIT(S): 5000 INJECTION INTRAVENOUS; SUBCUTANEOUS at 05:07

## 2021-01-01 RX ADMIN — CHLORHEXIDINE GLUCONATE 1 APPLICATION(S): 213 SOLUTION TOPICAL at 06:13

## 2021-01-01 RX ADMIN — SODIUM ZIRCONIUM CYCLOSILICATE 10 GRAM(S): 10 POWDER, FOR SUSPENSION ORAL at 06:15

## 2021-01-01 RX ADMIN — INSULIN HUMAN 5 UNIT(S): 100 INJECTION, SOLUTION SUBCUTANEOUS at 21:44

## 2021-01-01 RX ADMIN — SODIUM ZIRCONIUM CYCLOSILICATE 10 GRAM(S): 10 POWDER, FOR SUSPENSION ORAL at 13:16

## 2021-01-01 RX ADMIN — Medication 30.1 MICROGRAM(S)/KG/MIN: at 06:13

## 2021-01-01 RX ADMIN — Medication 250 MILLIGRAM(S): at 11:15

## 2021-01-01 RX ADMIN — CHLORHEXIDINE GLUCONATE 15 MILLILITER(S): 213 SOLUTION TOPICAL at 06:13

## 2021-01-01 RX ADMIN — Medication 650 MILLIGRAM(S): at 16:20

## 2021-01-01 RX ADMIN — SODIUM CHLORIDE 100 MILLILITER(S): 9 INJECTION INTRAMUSCULAR; INTRAVENOUS; SUBCUTANEOUS at 13:08

## 2021-01-01 RX ADMIN — PANTOPRAZOLE SODIUM 40 MILLIGRAM(S): 20 TABLET, DELAYED RELEASE ORAL at 12:15

## 2021-01-01 RX ADMIN — SODIUM CHLORIDE 100 MILLILITER(S): 9 INJECTION INTRAMUSCULAR; INTRAVENOUS; SUBCUTANEOUS at 18:37

## 2021-01-01 RX ADMIN — CHLORHEXIDINE GLUCONATE 15 MILLILITER(S): 213 SOLUTION TOPICAL at 18:39

## 2021-01-01 RX ADMIN — Medication 50 MILLILITER(S): at 21:46

## 2021-01-01 RX ADMIN — SODIUM ZIRCONIUM CYCLOSILICATE 10 GRAM(S): 10 POWDER, FOR SUSPENSION ORAL at 21:45

## 2021-01-01 RX ADMIN — SODIUM CHLORIDE 2500 MILLILITER(S): 9 INJECTION INTRAMUSCULAR; INTRAVENOUS; SUBCUTANEOUS at 09:58

## 2021-01-01 RX ADMIN — SODIUM ZIRCONIUM CYCLOSILICATE 10 GRAM(S): 10 POWDER, FOR SUSPENSION ORAL at 05:07

## 2021-01-01 RX ADMIN — HEPARIN SODIUM 6500 UNIT(S): 5000 INJECTION INTRAVENOUS; SUBCUTANEOUS at 13:14

## 2021-01-01 RX ADMIN — Medication 6 MILLIGRAM(S): at 06:14

## 2021-01-01 RX ADMIN — ALBUTEROL 2 PUFF(S): 90 AEROSOL, METERED ORAL at 21:47

## 2021-01-01 RX ADMIN — PIPERACILLIN AND TAZOBACTAM 200 GRAM(S): 4; .5 INJECTION, POWDER, LYOPHILIZED, FOR SOLUTION INTRAVENOUS at 10:50

## 2021-01-01 RX ADMIN — CHLORHEXIDINE GLUCONATE 15 MILLILITER(S): 213 SOLUTION TOPICAL at 05:15

## 2021-01-01 RX ADMIN — ATORVASTATIN CALCIUM 40 MILLIGRAM(S): 80 TABLET, FILM COATED ORAL at 21:48

## 2021-01-01 RX ADMIN — SODIUM CHLORIDE 2000 MILLILITER(S): 9 INJECTION INTRAMUSCULAR; INTRAVENOUS; SUBCUTANEOUS at 11:46

## 2021-01-01 RX ADMIN — Medication 100 GRAM(S): at 21:44

## 2021-01-01 RX ADMIN — CHLORHEXIDINE GLUCONATE 1 APPLICATION(S): 213 SOLUTION TOPICAL at 13:07

## 2021-01-01 RX ADMIN — MIDAZOLAM HYDROCHLORIDE 2 MILLIGRAM(S): 1 INJECTION, SOLUTION INTRAMUSCULAR; INTRAVENOUS at 16:35

## 2021-01-01 RX ADMIN — Medication 650 MILLIGRAM(S): at 13:14

## 2021-01-01 RX ADMIN — PROPOFOL 2.4 MICROGRAM(S)/KG/MIN: 10 INJECTION, EMULSION INTRAVENOUS at 13:39

## 2021-01-01 RX ADMIN — Medication 30.1 MICROGRAM(S)/KG/MIN: at 18:39

## 2021-01-01 RX ADMIN — PROPOFOL 3.86 MICROGRAM(S)/KG/MIN: 10 INJECTION, EMULSION INTRAVENOUS at 06:13

## 2021-01-01 RX ADMIN — Medication 6 MILLIGRAM(S): at 05:16

## 2021-03-01 NOTE — ED PROVIDER NOTE - CLINICAL SUMMARY MEDICAL DECISION MAKING FREE TEXT BOX
Patient in respiratory distress. O2 sat increased to 90 on high flow nonrebreather. However patient remains obtunded. Patient intubated for airway protection. Will admit to ICU. Patient in respiratory distress. O2 sat increased to 90 on high flow nonrebreather. However patient remains obtunded. Patient intubated for airway protection. Will admit to ICU. critical care time spent 45 minutes

## 2021-03-01 NOTE — H&P ADULT - NSICDXPASTMEDICALHX_GEN_ALL_CORE_FT
PAST MEDICAL HISTORY:  BPH (benign prostatic hyperplasia)     CAD (coronary artery disease)     Dementia     HTN (hypertension)

## 2021-03-01 NOTE — ED ADULT NURSE NOTE - OBJECTIVE STATEMENT
Received pt from bed 3 as notification , unresponsive - is/e by DR DELVALLE , intubated ET - 7.5, lip line 22, medicated as ordered ,

## 2021-03-01 NOTE — H&P ADULT - NSHPPHYSICALEXAM_GEN_ALL_CORE
ICU Vital Signs Last 24 Hrs  T(C): 39.1 (01 Mar 2021 10:29), Max: 39.1 (01 Mar 2021 10:29)  T(F): 102.4 (01 Mar 2021 10:29), Max: 102.4 (01 Mar 2021 10:29)  HR: 120 (01 Mar 2021 10:29) (120 - 136)  BP: 123/69 (01 Mar 2021 10:29) (96/60 - 131/101)  BP(mean): --  ABP: --  ABP(mean): --  RR: 18 (01 Mar 2021 10:29) (18 - 44)  SpO2: 96% (01 Mar 2021 10:29) (76% - 96%)      PHYSICAL EXAM:  GENERAL: pt in bed, intubated   HEENT: Normocephalic;  conjunctivae and sclerae clear; moist mucous membranes;   NECK : supple  CHEST/LUNG: bilateral breath sounds present, on mechanical ventilation, bilateral basilar crackles present,   HEART: tachycardia   ABDOMEN: Soft, Nontender, Nondistended; Bowel sounds present  EXTREMITIES: no cyanosis; no edema; no calf tenderness  SKIN: warm and dry; no rash  NERVOUS SYSTEM:  AAO x0,

## 2021-03-01 NOTE — CHART NOTE - NSCHARTNOTEFT_GEN_A_CORE
Pt CT head finding discussed with DR Rangel, neuro. Recommended starting heparin drip, without initial bolus. Avoid bolus while on heparin drip. Maximum rate to be 900 units per hour. This is to avoid hemorraghic conversion  of acute ischemia. Pt CT head finding discussed with DR Rangel, neuro. Recommended starting heparin drip, without initial bolus. Avoid bolus while on heparin drip. Maximum rate to be 900 units per hour. Goal PTT 50-70. This is to avoid hemorraghic conversion  of acute ischemia. Pt CT head finding discussed with DR Rangel, neuro. Recommended starting heparin drip, without initial bolus. Avoid bolus while on heparin drip. Maximum rate to be 900 units per hour. Goal PTT 50-70. This is to avoid hemorraghic conversion  of acute ischemia.    But pt's platelet count is 50. So will avoid Heparin. Risk for bleeding is more then benefits for suspected PE,

## 2021-03-01 NOTE — CHART NOTE - NSCHARTNOTEFT_GEN_A_CORE
Pt was transferred from Ed to ICU without attaching to monitor. Nurse Sveta was asked multiple times the importance of Monitoring Vitals and O2 saturation for critically ill, intubated pt. Nurse Josie Valdes was also informed that we are in CT scan and pt is not on Monitor. Pt brought to ICU without Monitor. ICU Nurse Ms Jimenez informed about the incidence.

## 2021-03-01 NOTE — ED PROVIDER NOTE - PHYSICAL EXAMINATION
Patient is unresponsive, not responsive to painful stimuli, not responsive to voice, no spontaneous movement, pupils pinpoint, no evidence for trauma, abdominal retractions, cyanosis of toes.

## 2021-03-01 NOTE — CONSULT NOTE ADULT - SUBJECTIVE AND OBJECTIVE BOX
NEUROLOGY CONSULT NOTE    NAME:  MARLON WILLS      ASSESSMENT:  85F with multiple ischemic strokes and possible pulmonary embolisms, likely secondary to hypercoagulable state, secondary to COVID-19 infection      RECOMMENDATIONS:    - Continue intermittent heparin due to thrombocytopenia    - If platelet count increases beyond 100,000, may start low-dose heparin drip (no bolus, no re-bolus, maximum rate of 900 units/hr)    - No antiplatelet agent at this time    - May initiate atorvastatin 40mg PO/PT QHS    - Check FLP, HbA1c, Echocardiogram            NOTE TO BE COMPLETED - PLEASE REFER TO ABOVE ONLY AND IGNORE INFORMATION BELOW    *******************************      CHIEF COMPLAINT:  Patient is a 85y old  Male who presents with a chief complaint of SOB (01 Mar 2021 17:59)      HPI:  84 yo male with PMH of HTN, BPH, Dementia, CAD(Bypass in 2014) presented with SOB. Pt followed with PCP on Feb 19 for routine visit, was noted to have fever of 101, without any respiratory symptoms and he tested + for COVID. Today breathing was worse so EMS was called. Pt noted to be saturating in 30s on RA, improved to 90s on NRB but was unresponsive so was intubated in ED.     Spoke to wife and daughter with Irish , they were not able to provide medical history so HPI was obtained from Pt's PCP, Dr Manuel [phone].       GOC: Full code. Explained to wife that pt is critically sick and prognosis is poor using Irish .  (01 Mar 2021 11:25)      NEURO HPI:      PAST MEDICAL & SURGICAL HISTORY:  Dementia    BPH (benign prostatic hyperplasia)    HTN (hypertension)    CAD (coronary artery disease)    H/O heart bypass surgery  2014        MEDICATIONS:  acetaminophen  Suppository .. 650 milliGRAM(s) Rectal every 6 hours PRN  ALBUTerol    90 MICROgram(s) HFA Inhaler 2 Puff(s) Inhalation every 10 minutes  atorvastatin 40 milliGRAM(s) Oral at bedtime  chlorhexidine 0.12% Liquid 15 milliLiter(s) Oral Mucosa every 12 hours  chlorhexidine 2% Cloths 1 Application(s) Topical every 12 hours  dexAMETHasone  Injectable 6 milliGRAM(s) IV Push daily  heparin   Injectable 5000 Unit(s) SubCutaneous every 8 hours  norepinephrine Infusion 0.5 MICROgram(s)/kG/Min IV Continuous <Continuous>  pantoprazole  Injectable 40 milliGRAM(s) IV Push daily  piperacillin/tazobactam IVPB.. 3.375 Gram(s) IV Intermittent every 12 hours  propofol Infusion 10.005 MICROgram(s)/kG/Min IV Continuous <Continuous>  sodium chloride 0.9% lock flush 10 milliLiter(s) IV Push every 1 hour PRN  sodium chloride 0.9%. 1000 milliLiter(s) IV Continuous <Continuous>  sodium zirconium cyclosilicate 10 Gram(s) Oral every 8 hours      ALLERGIES:  No Known Allergies      FAMILY HISTORY:  No pertinent family history in first degree relatives        SOCIAL HISTORY:  Denies alcohol, tobacco, or illicit drug use    REVIEW OF SYSTEMS:  GENERAL: No fever, weight changes, fatigue  EYES: No eye pain or discharge  EAR/NOSE/MOUTH/THROAT: No sinus or throat pain; No difficulty hearing  NECK: No pain or stiffness  RESPIRATORY: No cough, wheezing, chills, or hemoptysis  CARDIOVASCULAR: No chest pain, palpitations, shortness of breath, or dyspnea on exertion  GASTROINTESTINAL: No abdominal pain, nausea, vomiting, hematemesis, diarrhea, or constipation  GENITOURINARY: No dysuria, frequency, hematuria, or incontinence  SKIN: No rashes or lesions  ENDOCRINE: No heat or cold intolerance  HEMATOLOGIC: No easy bruising or bleeding  PSYCHIATRIC: No depression, anxiety, or mood swings  MUSCULOSKELETAL: No joint pain or swelling  NEUROLOGICAL: As per HPI      OBJECTIVE:    Vital Signs Last 24 Hrs  T(C): 36.9 (02 Mar 2021 04:00), Max: 39.1 (01 Mar 2021 10:29)  T(F): 98.5 (02 Mar 2021 04:00), Max: 102.4 (01 Mar 2021 10:29)  HR: 92 (02 Mar 2021 04:01) (86 - 136)  BP: 165/90 (02 Mar 2021 04:00) (61/35 - 258/92)  BP(mean): 103 (02 Mar 2021 04:00) (25 - 136)  RR: 28 (02 Mar 2021 04:00) (18 - 44)  SpO2: 100% (02 Mar 2021 04:01) (76% - 100%)    General Examination:  General: No acute distress  HEENT: Atraumatic, Normocephalic  Respiratory: CTA B/l.  No crackles, rhonchi, or wheezes.  Cardiovascular: RRR.  Normal S1 & S2.  Normal b/l radial and pedal pulses.    Neurological Examination:  General / Mental Status: AAO x 3.  No aphasia or dysarthria.  Naming and repetition intact.  Cranial Nerves: VFF x 4.  PERRL.  EOMI x 2, No nystagmus or diplopia.  B/l V1-V3 equal and intact to light touch and pinprick.  Symmetric facial movement and palate elevation.  B/l hearing equal to finger rub.  5/5 strength with b/l sternocleidomastoid & trapezius.  Midline tongue protrusion, with no atrophy or fasciculations.  Motor: Normal bulk & tone in all four extremities.  5/5 strength throughout all four extremities.  No downward drift, rigidity, spasticity, or tremors in any of the four extremities.  Sensory: Intact to light touch and pinprick in all four extremities.  Negative Romberg.  Reflex: 2+ and symmetric at b/l biceps, triceps, brachioradialis, patellae, and ankles.  Downgoing toes b/l.  Coordination: No dysmetria with b/l finger-to-nose and heel raise tests.  Symmetric rapid alternating movements b/l.  Gait: Normal, narrow-based gait.  No difficulty with tiptoe, heel, and tandem gaits.        LABORATORY VALUES:                        13.5   14.94 )-----------( 45       ( 01 Mar 2021 20:49 )             41.6       03-02    143  |  111<H>  |  81<H>  ----------------------------<  218<H>  5.4<H>   |  21<L>  |  4.94<H>    Ca    7.6<L>      02 Mar 2021 00:07  Phos  5.6     03-01  Mg     3.0     03-01    TPro  5.6<L>  /  Alb  1.7<L>  /  TBili  2.0<H>  /  DBili  x   /  AST  2000<H>  /  ALT  684<H>  /  AlkPhos  110  03-02                                NEUROIMAGING:          Please contact the Neurology consult service with any questions.    Per Rangel MD   of Neurology  [facility] at [facility] NEUROLOGY CONSULT NOTE    NAME:  MARLON WILLS      ASSESSMENT:  85F with multiple ischemic strokes and possible pulmonary embolisms, likely secondary to hypercoagulable state, secondary to COVID-19 infection      RECOMMENDATIONS:    - Continue intermittent heparin due to thrombocytopenia    - If platelet count increases beyond 100,000, may start low-dose heparin drip (no bolus, no re-bolus, maximum rate of 900 units/hr)    - No antiplatelet agent at this time    - May initiate atorvastatin 40mg PO/PT QHS    - Check FLP, HbA1c, Echocardiogram          *******************************      CHIEF COMPLAINT:  Patient is a 85y old  Male who presents with a chief complaint of SOB (01 Mar 2021 17:59)      HPI:  84 yo male with PMH of HTN, BPH, Dementia, CAD(Bypass in 2014) presented with SOB. Pt followed with PCP on Feb 19 for routine visit, was noted to have fever of 101, without any respiratory symptoms and he tested + for COVID. Today breathing was worse so EMS was called. Pt noted to be saturating in 30s on RA, improved to 90s on NRB but was unresponsive so was intubated in ED.     Spoke to wife and daughter with Portuguese , they were not able to provide medical history so HPI was obtained from Pt's PCP, Dr Manuel [phone].     GOC: Full code. Explained to wife that pt is critically sick and prognosis is poor using Portuguese .  (01 Mar 2021 11:25)      NEURO HPI:  85M with dementia and acute encephalopathy in the setting of COVID-19 infection. CT Head revealed evidence of subacute infarcts.    PAST MEDICAL & SURGICAL HISTORY:  Dementia  BPH (benign prostatic hyperplasia)  HTN (hypertension)  CAD (coronary artery disease)  H/O heart bypass surgery (2014)      MEDICATIONS:  acetaminophen  Suppository .. 650 milliGRAM(s) Rectal every 6 hours PRN  ALBUTerol    90 MICROgram(s) HFA Inhaler 2 Puff(s) Inhalation every 10 minutes  atorvastatin 40 milliGRAM(s) Oral at bedtime  chlorhexidine 0.12% Liquid 15 milliLiter(s) Oral Mucosa every 12 hours  chlorhexidine 2% Cloths 1 Application(s) Topical every 12 hours  dexAMETHasone  Injectable 6 milliGRAM(s) IV Push daily  heparin   Injectable 5000 Unit(s) SubCutaneous every 8 hours  norepinephrine Infusion 0.5 MICROgram(s)/kG/Min IV Continuous <Continuous>  pantoprazole  Injectable 40 milliGRAM(s) IV Push daily  piperacillin/tazobactam IVPB.. 3.375 Gram(s) IV Intermittent every 12 hours  propofol Infusion 10.005 MICROgram(s)/kG/Min IV Continuous <Continuous>  sodium chloride 0.9% lock flush 10 milliLiter(s) IV Push every 1 hour PRN  sodium chloride 0.9%. 1000 milliLiter(s) IV Continuous <Continuous>  sodium zirconium cyclosilicate 10 Gram(s) Oral every 8 hours    ALLERGIES:  No Known Allergies    FAMILY HISTORY:  No reported family history of stroke    SOCIAL HISTORY:  No reported history of alcohol, tobacco, or illicit drug use    REVIEW OF SYSTEMS:  GENERAL: Recent fever  EYES: No eye discharge  EAR/NOSE/MOUTH/THROAT: No sinus discharge  NECK: No stiffness  RESPIRATORY: Recent difficulty breathing  CARDIOVASCULAR: Recent shortness of breath  GASTROINTESTINAL: No reported nausea or vomiting  GENITOURINARY: No reported frequency  SKIN: No rashes or lesions  ENDOCRINE: No reported heat or cold intolerance  HEMATOLOGIC: Recent finding of thrombocytopenia  PSYCHIATRIC: No reported depression or anxiety  MUSCULOSKELETAL: No joint swelling  NEUROLOGICAL: As per HPI        OBJECTIVE:    Vital Signs Last 24 Hrs  T(C): 36.9 (02 Mar 2021 04:00), Max: 39.1 (01 Mar 2021 10:29)  T(F): 98.5 (02 Mar 2021 04:00), Max: 102.4 (01 Mar 2021 10:29)  HR: 92 (02 Mar 2021 04:01) (86 - 136)  BP: 165/90 (02 Mar 2021 04:00) (61/35 - 258/92)  BP(mean): 103 (02 Mar 2021 04:00) (25 - 136)  RR: 28 (02 Mar 2021 04:00) (18 - 44)  SpO2: 100% (02 Mar 2021 04:01) (76% - 100%)    General Examination  General: Intubated, Sedated on propofol, Unresponsive  Respiratory: Breath sounds consistent with ventilator  Cardiovascular: RRR, Weak b/l radial and pedal pulses    Neurological Exam:  General / Mental Status: Intubated, Sedated. Nonverbal, Does not follow commands.  Neurological exam is limited.  Cranial Nerves: Pinpoint pupils b/l.  Blink to threat absent b/l.  Eyelids closed at baseline, with no resistance to passive opening of eyelids b/l.  Does not track finger movements with eyes b/l.  No response to pinprick at b/l V1-V3 nor to snap at b/l ears.  No apparent facial asymmetry.  Midline palate and tongue.  No resistance to passive motion of neck b/l.  Motor: Diminished bulk and tone in all four extremities.  All four extremities drop to bed after passive elevation.  No spontaneous movement, rigidity, or spasticity in any of the four extremities.  Sensation: No response to nailbed pressure in any of the four extremities.  Reflexes: 1+ and symmetric at b/l biceps, triceps, brachioradialis, patellae, and ankles.  Toes mute b/l.  Unable to assess coordination, Romberg sign, or gait in unresponsive patient.        LABORATORY VALUES:                        13.5   14.94 )-----------( 45       ( 01 Mar 2021 20:49 )             41.6       03-02    143  |  111<H>  |  81<H>  ----------------------------<  218<H>  5.4<H>   |  21<L>  |  4.94<H>    Ca    7.6<L>      02 Mar 2021 00:07  Phos  5.6     03-01  Mg     3.0     03-01    TPro  5.6<L>  /  Alb  1.7<L>  /  TBili  2.0<H>  /  DBili  x   /  AST  2000<H>  /  ALT  684<H>  /  AlkPhos  110  03-02        NEUROIMAGING:    CT Head (3/1/21):  - Multiple acute infarcts at right frontal lobe, left occipital lobe, left cerebellar hemisphere, and left basal ganglia  - No acute intracranial hemorrhage        Please contact the Neurology consult service with any questions.    Per Rangel MD   of Neurology  [facility] at [facility]

## 2021-03-01 NOTE — H&P ADULT - ATTENDING COMMENTS
Wife updated about clinical condition with Greek  ID #[id number]. Requested to speak with son Patrick Thornton ([phone]). Explained guarded prognosis given multiple system organ failure.     Assessment:  1. Septic shock  2. Acute hypoxic respiratory failure  3. Hyperbilirubinemia   4. Acute kidney injury   5. Lactic acidosis  6. Acute Multifocal ischemic cerebral infarcts   7. Thrombocytopenia   8. Transaminitis  9. Coronary artery disease  10. Dementia     Plan  - Patient on mechanical ventilation with lung protective strategy   - IV steroids   - Unable to give remdesivir given liver and kidney function  - Airborne and contact isolation  - Broad spectrum antibiotics, as concern for possible cholangitis   - Obtain US of the abdomen to evaluate billary tree and gal bladder  - IV vasopressor support to maintain MAP > 65  - IV fluid resuscitation  - Monitor for signs of bleeding  - Neurology consult, recommends heparin infusion, but given thrombocytopenia, concern for increased bleeding risk at this time  - Check coags   - NPO for now  - Monitor fingerstick glucose levels  - Prognosis is poor given multisystem organ failure.

## 2021-03-01 NOTE — ED ADULT NURSE NOTE - NSIMPLEMENTINTERV_GEN_ALL_ED
Implemented All Fall with Harm Risk Interventions:  [place] to call system. Call bell, personal items and telephone within reach. Instruct patient to call for assistance. Room bathroom lighting operational. Non-slip footwear when patient is off stretcher. Physically safe environment: no spills, clutter or unnecessary equipment. Stretcher in lowest position, wheels locked, appropriate side rails in place. Provide visual cue, wrist band, yellow gown, etc. Monitor gait and stability. Monitor for mental status changes and reorient to person, place, and time. Review medications for side effects contributing to fall risk. Reinforce activity limits and safety measures with patient and family. Provide visual clues: red socks.

## 2021-03-01 NOTE — H&P ADULT - ASSESSMENT
ASSESSMENT AND PLAN:      Assement:   - Acute Hypoxic respiratory failure due to COVID PNA, requiring mechanical ventilation  - sepsis due to COVID and UTI   - KVNG   - Elevated D-dimer   - Hyperkalemia   -Transaminitis   - CAD   - HTN   - BPH   - Dementia     =================== Neuro============================  Alert and oriented x 0    Dementia             ================= Cardiovascular==========================    - CAD   - HTN       ================- Pulm=================================  Acute Hypoxic respiratory failure due to COVID PNA, requiring mechanical ventilation    Elevated D-dimer, most likely PE     ==================ID===================================  Acute Hypoxic respiratory failure due to COVID PNA, requiring mechanical ventilation  - sepsis due to COVID and UTI     Elevated Lactate     ================= Nephro================================  - KVNG   - Hyperkalemia     =================GI====================================  Transaminitis     ================ Heme==================================    =================Endocrine===============================    ================= Skin/Catheters============================  No rashes. Peripheral IV lines.     - =================Prophylaxis =============================   DVT proph  Protonix for  GI proph    ==================GOC==================================  Full code   ==================Disposition===============================  Pt accepted to ICU for further care   ASSESSMENT AND PLAN:      Assement:   - Acute Hypoxic respiratory failure due to COVID PNA, requiring mechanical ventilation  - sepsis due to COVID and UTI   - KVNG   - Elevated D-dimer   - Hyperkalemia   -Transaminitis   - Thrombocytopenia   - CAD   - HTN   - BPH   - Dementia     =================== Neuro============================  Alert and oriented x 0    Dementia             ================= Cardiovascular==========================    - CAD   - HTN       ================- Pulm=================================  Acute Hypoxic respiratory failure due to COVID PNA, requiring mechanical ventilation    Elevated D-dimer, most likely PE     ==================ID===================================  Acute Hypoxic respiratory failure due to COVID PNA, requiring mechanical ventilation  - sepsis due to COVID and UTI     Elevated Lactate     ================= Nephro================================  - KVNG   - Hyperkalemia     =================GI====================================  Transaminitis     ================ Heme==================================  Thrombocytopenia     =================Endocrine===============================    ================= Skin/Catheters============================  No rashes. Peripheral IV lines.     - =================Prophylaxis =============================   DVT proph  Protonix for  GI proph    ==================GOC==================================  Full code   ==================Disposition===============================  Pt accepted to ICU for further care   ASSESSMENT AND PLAN:  84 yo male with PMH of HTN, BPH, Dementia, CAD(Bypass in 2014) presented with SOB. Pt followed with PCP on Feb 19 for routine visit, was noted to have fever of 101, without any respiratory symptoms and he tested + for COVID. Today breathing was worse so EMS was called. Pt noted to be saturating in 30s on RA, improved to 90s on NRB but was unresponsive so was intubated in ED.     Assement:   - Acute Hypoxic respiratory failure due to COVID PNA, requiring mechanical ventilation  - septic shock  due to COVID and UTI   - Stroke   - KVNG   - Elevated D-dimer   - Hyperkalemia   -Transaminitis, elevated Bili   - Elevated Lactate   - Thrombocytopenia   - CAD   - HTN   - BPH   - Dementia     =================== Neuro============================  Alert and oriented x 0 off sedation, but noted to be over breathing the vent, will start Propofol   (Mental status base line AAO 3.)     Stroke:   p/w hypoxia,   CT head; Right frontal lobe, left occipital lobe, left cerebellar hemisphere and left basal ganglia lucency suspicious for acute infarcts.   EKG,   Trend Trop  home med: aspirin and Plavix continued.   Lipitor  40 mg   Dr Rangel consulted.   ECHO,Lipid profile, A1c, PT         Dementia: home med: Aricept on hold for now.       ================= Cardiovascular==========================  Shock due to sepsis : s/p 3L NS bolus.   on phenyl now   rest as below       - CAD : s/p CABG in 2014.  Home med: Aspirin, Plavix, coreg and Benicar   c/w aspirin and Plavix   hold Coreg and Benicar given shock      - HTN: home med: on  hold Coreg and Benicar given shock        ================- Pulm=================================  Acute Hypoxic respiratory failure due to COVID PNA, requiring mechanical ventilation   -p/w O2 sat: 30% on RA, s/.p intubation in ED  - c/w [facility] vent   - Decadron IV 6mg daily   - Hold Remdesivir given elevated LFTs   - follow inflammatory markers     Elevated D-dimer, most likely PE/DVT   - unable to get CT angio chest due to KVNG   - given acute ischemic stroke, and Platelet count of 50K, will not start heparin drip for now.  - Risk of bleed over weigh the benefit of heparin drip.   - US doppler LE     ==================ID===================================  Acute Hypoxic respiratory failure due to COVID PNA, requiring mechanical ventilation  - c/w [facility] vent   - Decadron IV 6mg daily   - Hold Remdesivir given elevated LFTs   - follow inflammatory markers     - septic shock due to COVID and UTI   -p/w SOB, fever: 101, WBC: 10K, Lactate: 10, UA: positive.  Also, bilirubin noted to be 1.4, ALP/SGOT/SGPT: 147/506/179,    -ED course: 3.5L NS, IV Zosyn and Vanco   -Sepsis 2/2 UTI, COVID and possible intra abdominal pathology   -c/w Zosyn   - follow blood culture and Urine culture  -ID consult: Dr bean   - trend lactate   - US Liver, spoke to [employer] and informed her the urgency of the study         ================= Nephro================================  - KVNG   BUN/Creat: 69/3.55  Most likely pre renal given shock   - s/p 3.5 l Ns bolus in Ed   - c/w IV fluids   - Dr Kennedy consulted   - urine lytes   Monitor BUn/creat   Monitor Urine op     - Hyperkalemia   k: 5.6, slightly hemolysed   s/p 3.5 L NS bolus in ED   Follow repeat Labs       - h/o BPH: home med on hold   [place] in place     =================GI====================================  Transaminitis   bilirubin noted to be 1.4, ALP/SGOT/SGPT: 147/506/179,     possible intra abdominal pathology   -c/w Zosyn   - follow blood culture  -ID consult: Dr geneva sandoval lactate   - US Liver, spoke to  [employer] and informed her the urgency of the study       ================ Heme==================================  Thrombocytopenia   - Platelet: 50   D/d: COVID related, or other viral infection vs ITP vs TTP   - follow Hepatitis panel, ADAMTs   - monitor Platelet count      =================Endocrine===============================  No active issues   - NPO   Follow A1c   ================= Skin/Catheters============================  No rashes. Peripheral IV lines.   Central line consent in charts     - =================Prophylaxis =============================   DVT proph: heparin   Protonix for  GI proph    ==================GOC==================================  Full code     Poor prognosis discussed with wife   ==================Disposition===============================  Pt accepted to ICU for further care   ASSESSMENT AND PLAN:  84 yo male with PMH of HTN, BPH, Dementia, CAD(Bypass in 2014) presented with SOB. Pt followed with PCP on Feb 19 for routine visit, was noted to have fever of 101, without any respiratory symptoms and he tested + for COVID. Today breathing was worse so EMS was called. Pt noted to be saturating in 30s on RA, improved to 90s on NRB but was unresponsive so was intubated in ED.     Assement:   - Acute Hypoxic respiratory failure due to COVID PNA, requiring mechanical ventilation  - septic shock  due to COVID and UTI   - Stroke   - KVNG   - Elevated D-dimer   - Hyperkalemia   -Transaminitis, elevated Bili   - Elevated Lactate   - Thrombocytopenia   - CAD   - HTN   - BPH   - Dementia     =================== Neuro============================  Alert and oriented x 0 off sedation, but noted to be over breathing the vent, will start Propofol   (Mental status base line AAO 3.)     Stroke:   p/w hypoxia,   CT head; Right frontal lobe, left occipital lobe, left cerebellar hemisphere and left basal ganglia lucency suspicious for acute infarcts.   EKG,   Trend Trop  home med: aspirin and Plavix continued.   Lipitor  40 mg   Dr Rangel consulted.   ECHO,Lipid profile, A1c, PT         Dementia: home med: Aricept on hold for now.       ================= Cardiovascular==========================  Shock due to sepsis : s/p 3L NS bolus.   on phenyl now   rest as below       - CAD : s/p CABG in 2014.  Home med: Aspirin, Plavix, coreg and Benicar   c/w aspirin and Plavix   hold Coreg and Benicar given shock      - HTN: home med: on  hold Coreg and Benicar given shock        ================- Pulm=================================  Acute Hypoxic respiratory failure due to COVID PNA, requiring mechanical ventilation   -p/w O2 sat: 30% on RA, s/.p intubation in ED  - c/w [facility] vent   - Decadron IV 6mg daily   - Hold Remdesivir given elevated LFTs   - follow inflammatory markers     Elevated D-dimer, most likely PE/DVT   - unable to get CT angio chest due to KVGN   - given acute ischemic stroke, and Platelet count of 50K, will not start heparin drip for now.  - Risk of bleed over weigh the benefit of heparin drip.   - US doppler LE     ==================ID===================================  Acute Hypoxic respiratory failure due to COVID PNA, requiring mechanical ventilation  - c/w [facility] vent   - Decadron IV 6mg daily   - Hold Remdesivir given elevated LFTs   - follow inflammatory markers     - septic shock due to COVID and UTI   -p/w SOB, fever: 101, WBC: 10K, Lactate: 10, UA: positive.  Also, bilirubin noted to be 1.4, ALP/SGOT/SGPT: 147/506/179,    -ED course: 3.5L NS, IV Zosyn and Vanco   -Sepsis 2/2 UTI, COVID and possible intra abdominal pathology   -c/w Zosyn   - follow blood culture and Urine culture  -ID consult: Dr bean   - trend lactate   - US Liver, spoke to [employer] and informed her the urgency of the study   - NPO for now         ================= Nephro================================  - KVNG   BUN/Creat: 69/3.55  Most likely pre renal given shock   - s/p 3.5 l Ns bolus in Ed   - c/w IV fluids   - Dr Kennedy consulted   - urine lytes   Monitor BUn/creat   Monitor Urine op     - Hyperkalemia   k: 5.6, slightly hemolysed   s/p 3.5 L NS bolus in ED   Follow repeat Labs       - h/o BPH: home med on hold   [place] in place     =================GI====================================  Transaminitis   bilirubin noted to be 1.4, ALP/SGOT/SGPT: 147/506/179,     possible intra abdominal pathology   -c/w Zosyn   - follow blood culture  -ID consult: Dr bean   - trend lactate   - US Liver, spoke to  [employer] and informed her the urgency of the study   - NPO for now       ================ Heme==================================  Thrombocytopenia   - Platelet: 50   D/d: COVID related, or other viral infection vs ITP vs TTP   - follow Hepatitis panel, ADAMTs   - monitor Platelet count      =================Endocrine===============================  No active issues   - NPO   Follow A1c   ================= Skin/Catheters============================  No rashes. Peripheral IV lines.   Central line consent in charts     - =================Prophylaxis =============================   DVT proph: heparin   Protonix for  GI proph    ==================GOC==================================  Full code     Poor prognosis discussed with wife   ==================Disposition===============================  Pt accepted to ICU for further care

## 2021-03-01 NOTE — ED PROVIDER NOTE - CARE PLAN
Principal Discharge DX:	Respiratory failure  Secondary Diagnosis:	Hypoxia  Secondary Diagnosis:	Sepsis  Secondary Diagnosis:	Encephalopathy  Secondary Diagnosis:	COVID-19  Secondary Diagnosis:	KVNG (acute kidney injury)

## 2021-03-01 NOTE — ED PROVIDER NOTE - PROGRESS NOTE DETAILS
DAVIN: Reached patient's daughter at [phone]. She said patient has had COVID for over a week and was improving. I discussed intubation, and daughter said "we should do everything." DAVIN: Reached patient's daughter Aris Cedeno at [phone]. She said patient has had COVID for over a week and was improving. I discussed intubation, and daughter said "we should do everything." Patient's wife number [phone]. admit to ICU

## 2021-03-01 NOTE — CHART NOTE - NSCHARTNOTEFT_GEN_A_CORE
Case discussed with Dr Simmons for CE of 110, pt came in with platelet count of 50K, dropped to 38K, risk of starting heparin drip is more then the benefits.   Pt is high risk for bleeding given Platelet of 38K.   Will not start on heparin drip now.   Prognosis is poor. Will call palliative.

## 2021-03-01 NOTE — H&P ADULT - HISTORY OF PRESENT ILLNESS
86 yo male with PMH of HTN, BPH, Dementia, CAD(Bypass in 2014) presented with SOB. Pt followed with PCP on Feb 19 for routine visit, was noted to have fever of 101, without any respiratory symptoms and he tested + for COVID. Today breathing was worse so EMS was called. Pt noted to be saturating in 30s on RA, improved to 90s on NRB but was unresponsive so was intubated in ED.     Spoke to wife and daughter with Vietnamese , they were not able to provide medical history so HPI was obtained from Pt's PCP, Dr Manuel [phone].       GOC: Full code. Explained to wife that pt is critically sick and prognosis is poor using Vietnamese .

## 2021-03-01 NOTE — CONSULT NOTE ADULT - SUBJECTIVE AND OBJECTIVE BOX
[facility] NEPHROLOGY- CONSULTATION NOTE    86 yo male with HTN on ARB, BPH, Dementia, CAD s/p CABG () p/w SOB. Pt a/w acute hypoxic respiratory distress 2/2 COVID-19 PNA s/p intubated, Septic shock, Hypotension, acute CVA, KVNG and hyperkalemia. Nephrology consulted for Elevated serum creatinine.    Unable to obtain history from patient: intubated/ sedated      PAST MEDICAL & SURGICAL HISTORY:  Dementia    BPH (benign prostatic hyperplasia)    HTN (hypertension)    CAD (coronary artery disease)    H/O heart bypass surgery        No Known Allergies    Home Medications Reviewed  Hospital Medications:   MEDICATIONS  (STANDING):  atorvastatin 40 milliGRAM(s) Oral at bedtime  chlorhexidine 0.12% Liquid 15 milliLiter(s) Oral Mucosa every 12 hours  chlorhexidine 2% Cloths 1 Application(s) Topical every 12 hours  dexAMETHasone  Injectable 6 milliGRAM(s) IV Push daily  heparin   Injectable 5000 Unit(s) SubCutaneous every 8 hours  norepinephrine Infusion 0.5 MICROgram(s)/kG/Min (30.1 mL/Hr) IV Continuous <Continuous>  pantoprazole  Injectable 40 milliGRAM(s) IV Push daily  piperacillin/tazobactam IVPB.. 3.375 Gram(s) IV Intermittent every 12 hours  propofol Infusion 10 MICROgram(s)/kG/Min (3.86 mL/Hr) IV Continuous <Continuous>  sodium chloride 0.9%. 1000 milliLiter(s) (100 mL/Hr) IV Continuous <Continuous>      REVIEW OF SYSTEMS: Unable to obtain; intubated/ sedated    VITALS:  T(F): 97.2 (21 @ 15:30), Max: 102.4 (21 @ 10:29)  HR: 105 (21 @ 17:45)  BP: 177/60 (21 @ 17:45)  RR: 34 (21 @ 17:45)  SpO2: 95% (21 @ 17:45)  Wt(kg): --      Weight (kg): 64.3 ( @ 15:15)    PHYSICAL EXAM:  Gen: Sedated  HEENT: +ETT  Neck: no JVD  Cards: RRR, +S1/S2,   Resp: +mechanical BS/ tachpenic  GI: soft, NT/ND, NABS  : +jasso with dark urine  Extremities: trace LE edema B/L  Derm: no rashes  Neuro: Sedated    LABS:      146<H>  |  114<H>  |  77<H>  ----------------------------<  157<H>  5.7<H>   |  21<L>  |  4.14<H>    Ca    7.3<L>      01 Mar 2021 16:27  Phos  5.2       Mg     3.0         TPro  5.8<L>  /  Alb  1.8<L>  /  TBili  2.9<H>  /  DBili  1.7<H>  /  AST  1480<H>  /  ALT  392<H>  /  AlkPhos  122<H>      Creatinine Trend: 4.14 <--, 3.55 <--                        13.8   12.48 )-----------( 38       ( 01 Mar 2021 16:26 )             42.6     Urine Studies:  Urinalysis Basic - ( 01 Mar 2021 11:14 )    Color: Yellow / Appearance: Clear / S.015 / pH:   Gluc:  / Ketone: Trace  / Bili: Negative / Urobili: 1   Blood:  / Protein: 100 / Nitrite: Positive   Leuk Esterase: Trace / RBC: 25-50 /HPF / WBC 3-5 /HPF   Sq Epi:  / Non Sq Epi: Few /HPF / Bacteria: Moderate /HPF        RADIOLOGY & ADDITIONAL STUDIES:        < from: CT Chest No Cont (21 @ 14:07) >    EXAM:  CT CHEST                            PROCEDURE DATE:  2021          < end of copied text >    < from: CT Chest No Cont (21 @ 14:07) >  IMPRESSION:  Bilateral groundglass infiltrates suggestive of Covid pneumonia. Other pneumonia and pulmonary edema are not excluded.    Emphysema.    ET tube in satisfactory position.    Ectatic ascending aorta at 4.3 cm.    < end of copied text >    < from: CT Head No Cont (21 @ 14:06) >    EXAM:  CT BRAIN                            PROCEDURE DATE:  2021        < end of copied text >    < from: CT Head No Cont (21 @ 14:06) >  IMPRESSION:    Right frontal lobe, left occipital lobe, left cerebellar hemisphere and left basal ganglia lucency suspicious for acute infarcts. Follow-up brain MRI is recommended for further evaluation.  No acute hemorrhage.    The results of this examination were discussed with Dr. Díaz in the ICU at 2:39 PM on 3/1/2021      < end of copied text >

## 2021-03-01 NOTE — ED PROVIDER NOTE - OBJECTIVE STATEMENT
86 y/o M pt BIB EMS to the ED for respiratory distress. According to family, patient had COVID-19 for about 1 week and was getting better but developed respiratory distress and became unresponsive today. Patients O2 sat in 30s on room air. Family states patient is full code.

## 2021-03-02 NOTE — CONSULT NOTE ADULT - PROBLEM SELECTOR RECOMMENDATION 4
2/2 acute and chronic illness. Patient with albumin 1.7, + skin failure. Remains NPO 2/2 critical illness. High risk of skin breakdown. Per CT head, patient with right frontal lobe, left occipital lobe, left cerebellar hemisphere and left basal ganglia lucency suspicious for acute infarcts; 2/2 hypercoagulable state due to COVID-19 infection as per neuro. In the context of dementia. Per CT head, patient with right frontal lobe, left occipital lobe, left cerebellar hemisphere and left basal ganglia lucency suspicious for acute infarcts; 2/2 hypercoagulable state due to COVID-19 infection as per neuro. In the context of dementia. Overall, patient with grave prognosis. Son is surrogate. Per CT head, patient with right frontal lobe, left occipital lobe, left cerebellar hemisphere and left basal ganglia lucency suspicious for acute infarcts; 2/2 hypercoagulable state due to COVID-19 infection as per neuro. Unable to a/c due to thrombocytopenia. In the context of dementia. Overall, patient with grave prognosis. Son is surrogate. Per CT head, patient with right frontal lobe, left occipital lobe, left cerebellar hemisphere and left basal ganglia lucency suspicious for acute infarcts; 2/2 hypercoagulable state due to COVID-19 infection as per neuro. Unable to a/c due to thrombocytopenia. In the context of dementia. Overall, patient with grave prognosis. Son is surrogate. Patient is now DNR.

## 2021-03-02 NOTE — CONSULT NOTE ADULT - PROBLEM SELECTOR RECOMMENDATION 9
2/2 shock due to COVID-19 PNA; involving lungs (patient intubated); heart (on pressor); kidneys (KVNG, Cr 5.53) liver (TB 1.6, AST 1499k, ). WBC 15.26, lactate 3.0, troponin 90.900. CT chest indicated Bilateral groundglass infiltrates suggestive of Covid pneumonia; Emphysema. CXR indicated RIGHT greater than LEFT diffuse airspace disease. Patient remains sedated, intubated, on pressor, IV abx, decadron. + skin failure. High risk of mortality. 2/2 shock due to COVID-19 PNA; involving lungs (patient intubated); heart (on pressor); kidneys (KVNG, Cr 5.53) liver (TB 1.6, AST 1499k, ). WBC 15.26, lactate 3.0, troponin 90.900. CT chest indicated Bilateral groundglass infiltrates suggestive of Covid pneumonia; Emphysema. CXR indicated RIGHT greater than LEFT diffuse airspace disease. Patient remains sedated, intubated, on pressor, IV abx, decadron. + skin failure. Grave prognosis; high risk of mortality. 2/2 shock due to COVID-19 PNA; involving lungs (patient intubated); heart (on pressor); kidneys (KVNG, Cr 5.53) liver (TB 1.6, AST 1499k, ). WBC 15.26, lactate 3.0, troponin 90.900. CT chest indicated Bilateral groundglass infiltrates suggestive of Covid pneumonia; Emphysema. CXR indicated RIGHT greater than LEFT diffuse airspace disease. Patient remains sedated, intubated, on pressor, IV abx, decadron. + skin failure. Grave prognosis; high risk of mortality. Patient's wife previously deferred to the son for medical decision making. 2/2 shock due to COVID-19 PNA; involving lungs (patient intubated); heart (on pressor); kidneys (KVNG, Cr 5.53; anuric) liver (TB 1.6, AST 1499k, ). WBC 15.26, lactate 3.0, troponin 90.900. CT chest indicated Bilateral groundglass infiltrates suggestive of Covid pneumonia; Emphysema. CXR indicated RIGHT greater than LEFT diffuse airspace disease. Patient remains sedated, intubated, on pressor, IV abx, decadron. + skin failure. Grave prognosis; high risk of mortality. Patient's wife previously deferred to the son for medical decision making. Full code for now. 2/2 shock due to COVID-19 PNA; involving lungs (patient intubated); heart (on pressor); kidneys (KVNG, Cr 5.53; anuric) liver (TB 1.6, AST 1499k, ). WBC 15.26, lactate 3.0, troponin 90.900. CT chest indicated Bilateral groundglass infiltrates suggestive of Covid pneumonia; Emphysema. CXR indicated RIGHT greater than LEFT diffuse airspace disease. Patient remains sedated, intubated, on pressor, IV abx, decadron. + skin failure. Grave prognosis; high risk of mortality. Patient's wife previously deferred to the son for medical decision making. Patient is now a DNR with plan for palliative extubation 3/3.

## 2021-03-02 NOTE — CONSULT NOTE ADULT - PROBLEM SELECTOR RECOMMENDATION 6
2/2 acute and chronic illness. Patient from home with dementia at baseline; now with MOF/shock/ischemic CVAs due to covid-19. Patient sedated/intubated, on pressor, +skin failure, dependent on ADLs. Patient with grave prognosis. 2/2 acute and chronic illness. Patient from home with dementia at baseline; now with MOF/shock/ischemic CVAs due to covid-19. Patient sedated/intubated, on pressor, +skin failure, dependent on ADLs. Patient with grave prognosis. Plan for palliative extubation 3/3.

## 2021-03-02 NOTE — CONSULT NOTE ADULT - PROBLEM SELECTOR RECOMMENDATION 2
2/2 COVID-19 PNA; comorbid MOF involving lungs, heart, kidneys, liver. WBC 15.26, lactate 3.0, troponin 90.900. CT chest indicated Bilateral groundglass infiltrates suggestive of Covid pneumonia; Emphysema. CXR indicated RIGHT greater than LEFT diffuse airspace disease. Patient remains sedated, intubated, on pressor, IV abx, decadron. High risk of mortality. 2/2 COVID-19 PNA; comorbid MOF involving lungs, heart, kidneys, liver. WBC 15.26, lactate 3.0, troponin 90.900. CT chest indicated Bilateral groundglass infiltrates suggestive of Covid pneumonia; Emphysema. CXR indicated RIGHT greater than LEFT diffuse airspace disease. Patient remains sedated, intubated, on pressor, IV abx, decadron. Grave prognosis. High risk of mortality. 2/2 COVID-19 PNA; comorbid MOF involving lungs, heart, kidneys, liver. WBC 15.26, lactate 3.0, troponin 90.900. CT chest indicated Bilateral groundglass infiltrates suggestive of Covid pneumonia; Emphysema. CXR indicated RIGHT greater than LEFT diffuse airspace disease. Patient remains sedated, intubated, on pressor, IV abx, decadron. Grave prognosis. High risk of mortality. Patient is now a DNR with plan for palliative extubation 3/3.

## 2021-03-02 NOTE — CONSULT NOTE ADULT - PROBLEM SELECTOR RECOMMENDATION 3
2/2 COVID-19; comorbid shock/MOF involving lungs, heart, kidneys, liver. WBC 15.26, lactate 3.0. CT chest indicated Bilateral groundglass infiltrates suggestive of Covid pneumonia; Emphysema. CXR indicated RIGHT greater than LEFT diffuse airspace disease. Patient remains sedated, intubated, on pressor, IV abx, decadron. High risk of mortality. 2/2 COVID-19; comorbid shock/MOF involving lungs, heart, kidneys, liver. WBC 15.26, lactate 3.0. CT chest indicated Bilateral groundglass infiltrates suggestive of Covid pneumonia; Emphysema. CXR indicated RIGHT greater than LEFT diffuse airspace disease. Patient remains sedated, intubated, on pressor, IV abx, decadron. High risk of mortality. DNR; palliative extubation 3/3.

## 2021-03-02 NOTE — PROGRESS NOTE ADULT - SUBJECTIVE AND OBJECTIVE BOX
[facility] NEPHROLOGY- PROGRESS NOTE    84 yo male with HTN on ARB, BPH, Dementia, CAD s/p CABG () p/w SOB. Pt a/w acute hypoxic respiratory distress 2/2 COVID-19 PNA s/p intubated, Septic shock 2/2 COVID/ UTI, Hypotension, acute CVA, KVNG and hyperkalemia. Nephrology consulted for Elevated serum creatinine.    Hospital Medications: Medications reviewed.  REVIEW OF SYSTEMS: Unable to obtain: intubated/sedated    VITALS:  T(F): 99.5 (21 @ 12:00), Max: 99.5 (21 @ 12:00)  HR: 105 (21 @ 12:28)  BP: 105/50 (21 @ 12:00)  RR: 24 (21 @ 12:00)  SpO2: 100% (21 @ 12:28)  Wt(kg): --  Height (cm): 172.7 ( @ 11:00)  Weight (kg): 64.3 ( @ 15:15)  BMI (kg/m2): 21.6 ( @ 11:00)  BSA (m2): 1.77 ( @ 11:00)     @ 07:01  -   @ 07:00  --------------------------------------------------------  IN: 1640.6 mL / OUT: 10 mL / NET: 1630.6 mL     @ 07:01  -   @ 12:51  --------------------------------------------------------  IN: 573 mL / OUT: 7 mL / NET: 566 mL      PHYSICAL EXAM:  Gen: Sedated  HEENT: +ETT  Cards: RRR, +S1/S2,   Resp: +mechanical BS  GI: soft, NT/ND, NABS  : +jasso with min urine  Extremities: +UE edema >trace LE edema B/L      LABS:      145  |  115<H>  |  91<H>  ----------------------------<  158<H>  5.9<H>   |  21<L>  |  5.53<H>    Ca    7.5<L>      02 Mar 2021 06:35  Phos  5.5       Mg     3.1     03    TPro  5.7<L>  /  Alb  1.7<L>  /  TBili  1.6<H>  /  DBili  0.9<H>  /  AST  1499<H>  /  ALT  596<H>  /  AlkPhos  99      Creatinine Trend: 5.53 <--, 4.94 <--, 4.62 <--, 4.14 <--, 3.55 <--                        13.0   15.26 )-----------( 33       ( 02 Mar 2021 06:35 )             40.4     Urine Studies:  Urinalysis Basic - ( 01 Mar 2021 11:14 )    Color: Yellow / Appearance: Clear / S.015 / pH:   Gluc:  / Ketone: Trace  / Bili: Negative / Urobili: 1   Blood:  / Protein: 100 / Nitrite: Positive   Leuk Esterase: Trace / RBC: 25-50 /HPF / WBC 3-5 /HPF   Sq Epi:  / Non Sq Epi: Few /HPF / Bacteria: Moderate /HPF      Creatinine, Random Urine: 288 mg/dL ( @ 17:57)  Osmolality, Random Urine: 823 mos/kg ( @ 17:57)  Sodium, Random Urine: <5 mmol/L ( @ 17:57)    RADIOLOGY & ADDITIONAL STUDIES:

## 2021-03-02 NOTE — PROGRESS NOTE ADULT - SUBJECTIVE AND OBJECTIVE BOX
NEUROLOGY FOLLOW-UP NOTE    NAME:  MARLON WILLS      ASSESSMENT:  85F with multiple ischemic strokes and possible pulmonary embolisms, likely secondary to hypercoagulable state, secondary to COVID-19 infection      RECOMMENDATIONS:    - Continue intermittent heparin due to thrombocytopenia    - If platelet count increases beyond 100,000, may start low-dose heparin drip (no bolus, no re-bolus, maximum rate of 900 units/hr)    - No antiplatelet agent at this time due to thrombocytopenia    - May initiate atorvastatin 40mg PO/PT QHS    - Check Echocardiogram as tolerated          NOTE TO BE COMPLETED - PLEASE REFER TO ABOVE ONLY AND IGNORE INFORMATION BELOW    ******************************    HPI:  86 yo male with PMH of HTN, BPH, Dementia, CAD(Bypass in 2014) presented with SOB. Pt followed with PCP on Feb 19 for routine visit, was noted to have fever of 101, without any respiratory symptoms and he tested + for COVID. Today breathing was worse so EMS was called. Pt noted to be saturating in 30s on RA, improved to 90s on NRB but was unresponsive so was intubated in ED.     Spoke to wife and daughter with Bengali , they were not able to provide medical history so HPI was obtained from Pt's PCP, Dr Manuel [phone].       GOC: Full code. Explained to wife that pt is critically sick and prognosis is poor using Bengali .  (01 Mar 2021 11:25)      NEURO HPI:      INTERVAL HISTORY:      MEDICATIONS:  chlorhexidine 0.12% Liquid 15 milliLiter(s) Oral Mucosa every 12 hours  chlorhexidine 2% Cloths 1 Application(s) Topical <User Schedule>  dexAMETHasone  Injectable 6 milliGRAM(s) IV Push daily  heparin   Injectable 5000 Unit(s) SubCutaneous every 8 hours  norepinephrine Infusion 0.5 MICROgram(s)/kG/Min IV Continuous <Continuous>  pantoprazole  Injectable 40 milliGRAM(s) IV Push daily  piperacillin/tazobactam IVPB.. 3.375 Gram(s) IV Intermittent every 12 hours  propofol Infusion 10.005 MICROgram(s)/kG/Min IV Continuous <Continuous>  sodium chloride 0.9% lock flush 10 milliLiter(s) IV Push every 1 hour PRN  sodium zirconium cyclosilicate 10 Gram(s) Oral every 8 hours      ALLERGIES:  No Known Allergies      REVIEW OF SYSTEMS:  Fourteen systems reviewed and negative except as in HPI / Interval History.        OBJECTIVE:  Vital Signs Last 24 Hrs  T(C): 36.7 (03 Mar 2021 04:38), Max: 40.2 (03 Mar 2021 00:00)  T(F): 98.1 (03 Mar 2021 04:38), Max: 104.4 (03 Mar 2021 00:00)  HR: 84 (03 Mar 2021 05:00) (79 - 130)  BP: 84/51 (03 Mar 2021 05:00) (74/52 - 220/81)  BP(mean): 60 (03 Mar 2021 05:00) (43 - 150)  RR: 20 (03 Mar 2021 05:00) (20 - 33)  SpO2: 100% (03 Mar 2021 03:32) (97% - 100%)    General Examination:  General: No acute distress  HEENT: Atraumatic, Normocephalic  Respiratory: CTA B/l.  No crackles, rhonchi, or wheezes.  Cardiovascular: RRR.  Normal S1 & S2.  Normal b/l radial and pedal pulses.    Neurological Examination:  General / Mental Status: AAO x 3.  No aphasia or dysarthria.  Naming and repetition intact.  Cranial Nerves: VFF x 4.  PERRL.  EOMI x 2, No nystagmus or diplopia.  B/l V1-V3 equal and intact to light touch and pinprick.  Symmetric facial movement and palate elevation.  B/l hearing equal to finger rub.  5/5 strength with b/l sternocleidomastoid & trapezius.  Midline tongue protrusion, with no atrophy or fasciculations.  Motor: Normal bulk & tone in all four extremities.  5/5 strength throughout all four extremities.  No downward drift, rigidity, spasticity, or tremors in any of the four extremities.  Sensory: Intact to light touch and pinprick in all four extremities.  Negative Romberg.  Reflex: 2+ and symmetric at b/l biceps, triceps, brachioradialis, patellae, and ankles.  Downgoing toes b/l.  Coordination: No dysmetria with b/l finger-to-nose and heel raise tests.  Symmetric rapid alternating movements b/l.  Gait: Normal, narrow-based gait.  No difficulty with tiptoe, heel, and tandem gaits.        LABORATORY VALUES:                          13.4   18.69 )-----------( 25       ( 02 Mar 2021 13:39 )             41.2       03-02    144  |  114<H>  |  97<H>  ----------------------------<  143<H>  5.5<H>   |  21<L>  |  6.25<H>    Ca    7.2<L>      02 Mar 2021 13:39  Phos  6.3     03-02  Mg     3.0     03-02    TPro  5.6<L>  /  Alb  1.7<L>  /  TBili  1.5<H>  /  DBili  x   /  AST  1013<H>  /  ALT  570<H>  /  AlkPhos  104  03-02 03-02 Chol 67 LDL -- HDL 20<L> Trig 156<H>    Glucose Trend  03-02-21 @ 17:28   -  -- -- 142<H>  03-02-21 @ 13:39   -  -- 143<H> --  03-02-21 @ 11:37   -  -- -- 141<H>  03-02-21 @ 06:35   -  -- 158<H> --  03-02-21 @ 00:07   -  -- 218<H> --  03-01-21 @ 23:03   -  -- -- 197<H>  03-01-21 @ 22:01   -  -- -- 163<H>  03-01-21 @ 20:49   -  -- 158<H> --  03-01-21 @ 16:27   -  -- 157<H> --  03-01-21 @ 10:18   -  -- 105<H> --    Folate, Serum: 14.9 ng/mL (03-02-21 @ 16:33)  Vitamin B12, Serum: >2000 pg/mL (03-02-21 @ 16:33)    A1C with Estimated Average Glucose (03.02.21 @ 12:25)   A1C with Estimated Average Glucose Result: 6.2%            NEUROIMAGING:          Please contact the Neurology consult service with any questions.      Per Rangel MD   of Neurology  [facility]         NEUROLOGY FOLLOW-UP NOTE    NAME:  MARLON WILLS      ASSESSMENT:  85F with multiple ischemic strokes and possible pulmonary embolisms, likely secondary to hypercoagulable state, secondary to COVID-19 infection      RECOMMENDATIONS:    - Continue intermittent heparin due to thrombocytopenia    - If platelet count increases beyond 100,000, may start low-dose heparin drip (no bolus, no re-bolus, maximum rate of 900 units/hr)    - No antiplatelet agent at this time due to thrombocytopenia    - May initiate atorvastatin 40mg PO/PT QHS    - Check Echocardiogram as tolerated        ******************************    HPI:  84 yo male with PMH of HTN, BPH, Dementia, CAD(Bypass in 2014) presented with SOB. Pt followed with PCP on Feb 19 for routine visit, was noted to have fever of 101, without any respiratory symptoms and he tested + for COVID. Today breathing was worse so EMS was called. Pt noted to be saturating in 30s on RA, improved to 90s on NRB but was unresponsive so was intubated in ED.     Spoke to wife and daughter with Pashto , they were not able to provide medical history so HPI was obtained from Pt's PCP, Dr Manuel [phone].     GOC: Full code. Explained to wife that pt is critically sick and prognosis is poor using Pashto .  (01 Mar 2021 11:25)      NEURO HPI:  85M with dementia and acute encephalopathy in the setting of COVID-19 infection. CT Head revealed evidence of subacute infarcts.      INTERVAL HISTORY:  The patient continues to be intubated, sedated, and unresponsive    MEDICATIONS:  chlorhexidine 0.12% Liquid 15 milliLiter(s) Oral Mucosa every 12 hours  chlorhexidine 2% Cloths 1 Application(s) Topical <User Schedule>  dexAMETHasone  Injectable 6 milliGRAM(s) IV Push daily  heparin   Injectable 5000 Unit(s) SubCutaneous every 8 hours  norepinephrine Infusion 0.5 MICROgram(s)/kG/Min IV Continuous <Continuous>  pantoprazole  Injectable 40 milliGRAM(s) IV Push daily  piperacillin/tazobactam IVPB.. 3.375 Gram(s) IV Intermittent every 12 hours  propofol Infusion 10.005 MICROgram(s)/kG/Min IV Continuous <Continuous>  sodium chloride 0.9% lock flush 10 milliLiter(s) IV Push every 1 hour PRN  sodium zirconium cyclosilicate 10 Gram(s) Oral every 8 hours      ALLERGIES:  No Known Allergies      REVIEW OF SYSTEMS:  Fourteen systems reviewed and negative except as in HPI / Interval History.        OBJECTIVE:  Vital Signs Last 24 Hrs  T(C): 36.7 (03 Mar 2021 04:38), Max: 40.2 (03 Mar 2021 00:00)  T(F): 98.1 (03 Mar 2021 04:38), Max: 104.4 (03 Mar 2021 00:00)  HR: 84 (03 Mar 2021 05:00) (79 - 130)  BP: 84/51 (03 Mar 2021 05:00) (74/52 - 220/81)  BP(mean): 60 (03 Mar 2021 05:00) (43 - 150)  RR: 20 (03 Mar 2021 05:00) (20 - 33)  SpO2: 100% (03 Mar 2021 03:32) (97% - 100%)    General Examination  General: Intubated, Sedated on propofol, Unresponsive  Respiratory: Rapid rate, Breath sounds consistent with ventilator  Cardiovascular: RRR, Weak b/l radial and pedal pulses    Neurological Exam:  General / Mental Status: Intubated, Sedated. Nonverbal, Does not follow commands.  Neurological exam is limited.  Cranial Nerves: Pinpoint pupils b/l.  Blink to threat absent b/l.  Eyelids closed at baseline, with no resistance to passive opening of eyelids b/l.  Does not track finger movements with eyes b/l.  No response to pinprick at b/l V1-V3 nor to snap at b/l ears.  No apparent facial asymmetry.  Midline palate and tongue.  No resistance to passive motion of neck b/l.  Motor: Diminished bulk and tone in all four extremities.  All four extremities drop to bed after passive elevation.  No spontaneous movement, rigidity, or spasticity in any of the four extremities.  Sensation: No response to nailbed pressure in any of the four extremities.  Reflexes: 1+ and symmetric at b/l biceps, triceps, brachioradialis, patellae, and ankles.  Toes mute b/l.  Unable to assess coordination, Romberg sign, or gait in unresponsive patient.        LABORATORY VALUES:                          13.4   18.69 )-----------( 25       ( 02 Mar 2021 13:39 )             41.2       03-02    144  |  114<H>  |  97<H>  ----------------------------<  143<H>  5.5<H>   |  21<L>  |  6.25<H>    Ca    7.2<L>      02 Mar 2021 13:39  Phos  6.3     03-02  Mg     3.0     03-02    TPro  5.6<L>  /  Alb  1.7<L>  /  TBili  1.5<H>  /  DBili  x   /  AST  1013<H>  /  ALT  570<H>  /  AlkPhos  104  03-02 03-02 Chol 67 LDL -- HDL 20<L> Trig 156<H>    Glucose Trend  03-02-21 @ 17:28   -  -- -- 142<H>  03-02-21 @ 13:39   -  -- 143<H> --  03-02-21 @ 11:37   -  -- -- 141<H>  03-02-21 @ 06:35   -  -- 158<H> --  03-02-21 @ 00:07   -  -- 218<H> --  03-01-21 @ 23:03   -  -- -- 197<H>  03-01-21 @ 22:01   -  -- -- 163<H>  03-01-21 @ 20:49   -  -- 158<H> --  03-01-21 @ 16:27   -  -- 157<H> --  03-01-21 @ 10:18   -  -- 105<H> --    Folate, Serum: 14.9 ng/mL (03-02-21 @ 16:33)  Vitamin B12, Serum: >2000 pg/mL (03-02-21 @ 16:33)    A1C with Estimated Average Glucose (03.02.21 @ 12:25)   A1C with Estimated Average Glucose Result: 6.2%        NEUROIMAGING:    CT Head (3/1/21):  - Multiple acute infarcts at right frontal lobe, left occipital lobe, left cerebellar hemisphere, and left basal ganglia  - No acute intracranial hemorrhage          Please contact the Neurology consult service with any questions.      Per Rangel MD   of Neurology  [facility]

## 2021-03-02 NOTE — CONSULT NOTE ADULT - SUBJECTIVE AND OBJECTIVE BOX
HISTORY OF PRESENT ILLNESS: HPI:  84 yo male with PMH of HTN, BPH, Dementia, CAD (Bypass in 2014) presented with SOB. Pt followed with PCP on Feb 19 for routine visit, was noted to have fever of 101, without any respiratory symptoms and he tested + for COVID. Today breathing was worse so EMS was called. Pt noted to be saturating in 30s on RA, improved to 90s on NRB but was unresponsive so was intubated in ED.     Spoke to wife and daughter with Georgian , they were not able to provide medical history so HPI was obtained from Pt's PCP, Dr Manuel [phone].       GOC: Full code. Explained to wife that pt is critically sick and prognosis is poor using Georgian .  (01 Mar 2021 11:25)      PAST MEDICAL & SURGICAL HISTORY:  Dementia    BPH (benign prostatic hyperplasia)    HTN (hypertension)    CAD (coronary artery disease)    H/O heart bypass surgery  2014            MEDICATIONS:  MEDICATIONS  (STANDING):  chlorhexidine 0.12% Liquid 15 milliLiter(s) Oral Mucosa every 12 hours  chlorhexidine 2% Cloths 1 Application(s) Topical <User Schedule>  dexAMETHasone  Injectable 6 milliGRAM(s) IV Push daily  heparin   Injectable 5000 Unit(s) SubCutaneous every 8 hours  norepinephrine Infusion 0.5 MICROgram(s)/kG/Min (30.1 mL/Hr) IV Continuous <Continuous>  pantoprazole  Injectable 40 milliGRAM(s) IV Push daily  piperacillin/tazobactam IVPB.. 3.375 Gram(s) IV Intermittent every 12 hours  propofol Infusion 10.005 MICROgram(s)/kG/Min (3.86 mL/Hr) IV Continuous <Continuous>  sodium chloride 0.9%. 1000 milliLiter(s) (100 mL/Hr) IV Continuous <Continuous>  sodium zirconium cyclosilicate 10 Gram(s) Oral every 8 hours      Allergies    No Known Allergies    Intolerances        FAMILY HISTORY:  No pertinent family history in first degree relatives      Non-contributary for premature coronary disease or sudden cardiac death    SOCIAL HISTORY:    [ ] Non-smoker  [ ] Smoker  [ ] Alcohol    FLU VACCINE THIS YEAR STARTS IN AUGUST:  [ ] Yes    [ ] No    IF OVER 65 HAVE YOU EVER HAD A PNA VACCINE:  [ ] Yes    [ ] No       [ ] N/A      REVIEW OF SYSTEMS:  [ ]chest pain  [  ]shortness of breath  [  ]palpitations  [  ]syncope  [ ]near syncope [ ]upper extremity weakness   [ ] lower extremity weakness  [  ]diplopia  [  ]altered mental status   [  ]fevers  [ ]chills [ ]nausea  [ ]vomitting  [  ]dysphagia    [ ]abdominal pain  [ ]melena  [ ]BRBPR    [  ]epistaxis  [  ]rash    [ ]lower extremity edema        [ ] All others negative	  [ ] Unable to obtain      LABS:	 	    CARDIAC MARKERS:  CARDIAC MARKERS ( 02 Mar 2021 06:55 )  90.900 ng/mL / x     / x     / x     / x      CARDIAC MARKERS ( 02 Mar 2021 00:07 )  111.000 ng/mL / x     / 1330 U/L / x     / 97.7 ng/mL  CARDIAC MARKERS ( 01 Mar 2021 20:49 )  115 ng/mL / x     / 1489 U/L / x     / 102.2 ng/mL  CARDIAC MARKERS ( 01 Mar 2021 16:27 )  110.000 ng/mL / x     / 1658 U/L / x     / 107.6 ng/mL                              13.0   15.26 )-----------( 33       ( 02 Mar 2021 06:35 )             40.4     Hb Trend: 13.0<--, 13.5<--, 13.8<--    03-02    145  |  115<H>  |  91<H>  ----------------------------<  158<H>  5.9<H>   |  21<L>  |  5.53<H>    Ca    7.5<L>      02 Mar 2021 06:35  Phos  5.5     03-02  Mg     3.1     03-02    TPro  5.7<L>  /  Alb  1.7<L>  /  TBili  1.6<H>  /  DBili  0.9<H>  /  AST  1499<H>  /  ALT  596<H>  /  AlkPhos  99  03-02    Creatinine Trend: 5.53<--, 4.94<--, 4.62<--, 4.14<--, 3.55<--    Coags:  PT/INR - ( 02 Mar 2021 06:35 )   PT: 15.1 sec;   INR: 1.28 ratio         PTT - ( 02 Mar 2021 06:35 )  PTT:32.6 sec    proBNP:   Lipid Profile:   HgA1c:   TSH:         PHYSICAL EXAM:  T(C): 37.3 (03-02-21 @ 08:00), Max: 37.3 (03-02-21 @ 08:00)  HR: 100 (03-02-21 @ 11:00) (79 - 119)  BP: 101/51 (03-02-21 @ 11:00) (61/35 - 258/92)  RR: 24 (03-02-21 @ 11:00) (19 - 36)  SpO2: 98% (03-02-21 @ 11:00) (91% - 100%)  Wt(kg): --   BMI (kg/m2): 21.6 (03-02-21 @ 11:00)  I&O's Summary    01 Mar 2021 07:01  -  02 Mar 2021 07:00  --------------------------------------------------------  IN: 1640.6 mL / OUT: 10 mL / NET: 1630.6 mL    02 Mar 2021 07:01  -  02 Mar 2021 11:16  --------------------------------------------------------  IN: 463.4 mL / OUT: 7 mL / NET: 456.4 mL        Gen: Appears well in NAD  HEENT:  (-)icterus (-)pallor  CV: N S1 S2 1/6 MARCIO (+)2 Pulses B/l  Resp:  Clear to ausculatation B/L, normal effort  GI: (+) BS Soft, NT, ND  Lymph:  (-)Edema, (-)obvious lymphadenopathy  Skin: Warm to touch, Normal turgor  Psych: Appropriate mood and affect        TELEMETRY: 	      ECG:  	    RADIOLOGY:         CXR:     ASSESSMENT/PLAN: 	85y Male         HISTORY OF PRESENT ILLNESS: HPI:  86 yo male with PMH of HTN, BPH, Dementia, CAD (Bypass in 2014) presented with SOB. Pt followed with PCP on Feb 19 for routine visit, was noted to have fever of 101, without any respiratory symptoms and he tested + for COVID. Today breathing was worse so EMS was called. Pt noted to be saturating in 30s on RA, improved to 90s on NRB but was unresponsive so was intubated in ED.     Spoke to wife and daughter with Armenian , they were not able to provide medical history so HPI was obtained from Pt's PCP, Dr Manuel [phone].       GOC: Full code. Explained to wife that pt is critically sick and prognosis is poor using Armenian .  (01 Mar 2021 11:25)      PAST MEDICAL & SURGICAL HISTORY:  Dementia    BPH (benign prostatic hyperplasia)    HTN (hypertension)    CAD (coronary artery disease)    H/O heart bypass surgery  2014            MEDICATIONS:  MEDICATIONS  (STANDING):  chlorhexidine 0.12% Liquid 15 milliLiter(s) Oral Mucosa every 12 hours  chlorhexidine 2% Cloths 1 Application(s) Topical <User Schedule>  dexAMETHasone  Injectable 6 milliGRAM(s) IV Push daily  heparin   Injectable 5000 Unit(s) SubCutaneous every 8 hours  norepinephrine Infusion 0.5 MICROgram(s)/kG/Min (30.1 mL/Hr) IV Continuous <Continuous>  pantoprazole  Injectable 40 milliGRAM(s) IV Push daily  piperacillin/tazobactam IVPB.. 3.375 Gram(s) IV Intermittent every 12 hours  propofol Infusion 10.005 MICROgram(s)/kG/Min (3.86 mL/Hr) IV Continuous <Continuous>  sodium chloride 0.9%. 1000 milliLiter(s) (100 mL/Hr) IV Continuous <Continuous>  sodium zirconium cyclosilicate 10 Gram(s) Oral every 8 hours      Allergies    No Known Allergies    Intolerances        FAMILY HISTORY:  No pertinent family history in first degree relatives      Non-contributary for premature coronary disease or sudden cardiac death    SOCIAL HISTORY:    [ ] Non-smoker  [ ] Smoker  [ ] Alcohol    FLU VACCINE THIS YEAR STARTS IN AUGUST:  [ ] Yes    [ ] No    IF OVER 65 HAVE YOU EVER HAD A PNA VACCINE:  [ ] Yes    [ ] No       [ ] N/A      REVIEW OF SYSTEMS:  [ ]chest pain  [  ]shortness of breath  [  ]palpitations  [  ]syncope  [ ]near syncope [ ]upper extremity weakness   [ ] lower extremity weakness  [  ]diplopia  [  ]altered mental status   [  ]fevers  [ ]chills [ ]nausea  [ ]vomitting  [  ]dysphagia    [ ]abdominal pain  [ ]melena  [ ]BRBPR    [  ]epistaxis  [  ]rash    [ ]lower extremity edema        [ ] All others negative	  [ X] Unable to obtain      LABS:	 	    CARDIAC MARKERS:  CARDIAC MARKERS ( 02 Mar 2021 06:55 )  90.900 ng/mL / x     / x     / x     / x      CARDIAC MARKERS ( 02 Mar 2021 00:07 )  111.000 ng/mL / x     / 1330 U/L / x     / 97.7 ng/mL  CARDIAC MARKERS ( 01 Mar 2021 20:49 )  115 ng/mL / x     / 1489 U/L / x     / 102.2 ng/mL  CARDIAC MARKERS ( 01 Mar 2021 16:27 )  110.000 ng/mL / x     / 1658 U/L / x     / 107.6 ng/mL                              13.0   15.26 )-----------( 33       ( 02 Mar 2021 06:35 )             40.4     Hb Trend: 13.0<--, 13.5<--, 13.8<--    03-02    145  |  115<H>  |  91<H>  ----------------------------<  158<H>  5.9<H>   |  21<L>  |  5.53<H>    Ca    7.5<L>      02 Mar 2021 06:35  Phos  5.5     03-02  Mg     3.1     03-02    TPro  5.7<L>  /  Alb  1.7<L>  /  TBili  1.6<H>  /  DBili  0.9<H>  /  AST  1499<H>  /  ALT  596<H>  /  AlkPhos  99  03-02    Creatinine Trend: 5.53<--, 4.94<--, 4.62<--, 4.14<--, 3.55<--    Coags:  PT/INR - ( 02 Mar 2021 06:35 )   PT: 15.1 sec;   INR: 1.28 ratio         PTT - ( 02 Mar 2021 06:35 )  PTT:32.6 sec        PHYSICAL EXAM:  T(C): 37.3 (03-02-21 @ 08:00), Max: 37.3 (03-02-21 @ 08:00)  HR: 100 (03-02-21 @ 11:00) (79 - 119)  BP: 101/51 (03-02-21 @ 11:00) (61/35 - 258/92)  RR: 24 (03-02-21 @ 11:00) (19 - 36)  SpO2: 98% (03-02-21 @ 11:00) (91% - 100%)  Wt(kg): --   BMI (kg/m2): 21.6 (03-02-21 @ 11:00)  I&O's Summary    01 Mar 2021 07:01  -  02 Mar 2021 07:00  --------------------------------------------------------  IN: 1640.6 mL / OUT: 10 mL / NET: 1630.6 mL    02 Mar 2021 07:01  -  02 Mar 2021 11:16  --------------------------------------------------------  IN: 463.4 mL / OUT: 7 mL / NET: 456.4 mL        HEENT:  (-)icterus (-)pallor  CV: N S1 S2 1/6 MARCIO (+)2 Pulses B/l  Resp:  Clear to ausculatation B/L, normal effort  GI: (+) BS Soft, NT, ND  Lymph:  (-)Edema, (-)obvious lymphadenopathy  Skin: Warm to touch, Normal turgor  Psych: Appropriate mood and affect        TELEMETRY: 	  sinus    ECG:  	Sinus 128 BPM, RBBB    RADIOLOGY:         CXR:    ET tube tip above tracheal bifurcation.  NG tube tip beyond GE junction.  RIGHT IJ catheter tip in SVC.  .   RIGHT greater than LEFT diffuse airspace disease.        ASSESSMENT/PLAN: 	85y Male PMH of HTN, BPH, Dementia, CAD (Bypass in 2014) presented with SOB. Pt followed with PCP on Feb 19 for routine visit, was noted to have fever of 101, without any respiratory symptoms and he tested + for COVID. Today breathing was worse so EMS was called. Pt noted to be saturating in 30s on RA, improved to 90s on NRB but was unresponsive so was intubated in ED.     - NSTEMI, likely type II MI in the setting of profound hypoxia  - Unable to anticoagulate due to dangerously low Platelet levels  - KVNG, renal f/u  - COVID treatment per MICU  - cont supportive care  - Prognosis appears poor, palliative f/u to help with GOC    I once again thank you for allowing me to participate in the care of your patient.  If you have any questions or concerns please do not hesitate to contact me.    Michael Simmons MD, [facility]  BEEPER [phone]

## 2021-03-02 NOTE — CONSULT NOTE ADULT - ASSESSMENT
86 yo male with HTN on ARB, BPH, Dementia, CAD s/p CABG (2014) p/w SOB. Pt a/w acute hypoxic respiratory distress 2/2 COVID-19 PNA s/p intubated, Septic shock 2/2 COVID/ UTI, Hypotension, acute CVA, KVNG and hyperkalemia. Nephrology consulted for Elevated serum creatinine.    1. KVNG- unknown baseline SCr. KVNG likely hemodynamically mediated in the setting of septic shock / infection 2/2 COVID-19, hypotension on pressors;  likely ATN. UA active in the setting of UTI. FeNa 0.05% with hyaline cast; low EABV. Recc NS @ 75 cc/hr. Check hepBsAg. Will defer Renal US due to COVID status.   Strict I/Os. Avoid nephrotoxins/ NSAIDs/ RCA. Monitor BMP.  2. Hyperkalemia- Recc Lokelma 10g via NGT q 8hrs x 48 hrs. Monitor lytes  3. Septic Shock due to Multifocal PNA 2/2 COVID-19/ UTI- Abx/ Pressors as per ICU. f/U BCx and UCx  4. Hypotension- Pressors as per ICU. Monitor BP  5. Acute hypoxic respiratory failure- vent support as per ICU.  
Septic Shock - with multiorgan failure  COVID - 19 infection / Pneumonia  Fevers  Leukocytosis      Plan - Cont Decadron 6 mgs iv q24 hrs  Cont Zosyn 3.375gms iv q12 hrs for now  await culture results.  Prognosis is very poor.

## 2021-03-02 NOTE — CONSULT NOTE ADULT - PROBLEM SELECTOR RECOMMENDATION 7
See GOC section above. Per report, patient's wife previously deferred to her son/Patrick Thornton ([phone]) for medical decision making. Per report, patient's wife previously deferred to her son/Patrick Thornton ([phone]) for medical decision making. Patient with grave prognosis; high risk of mortality. Patient remains a full code at this time. Per report, patient's wife previously deferred to her son/Patrick Thornton ([phone]) for medical decision making. Patient with grave prognosis; high risk of mortality. Patient is now a DNR; plan for palliative extubation 3/3.

## 2021-03-02 NOTE — PROGRESS NOTE ADULT - ASSESSMENT
84 yo male with HTN on ARB, BPH, Dementia, CAD s/p CABG (2014) p/w SOB. Pt a/w acute hypoxic respiratory distress 2/2 COVID-19 PNA s/p intubated, Septic shock 2/2 COVID/ UTI, Hypotension, acute CVA, KVNG and hyperkalemia. Nephrology consulted for Elevated serum creatinine.    1. KVNG- unknown baseline SCr. KVNG likely hemodynamically mediated in the setting of septic shock / infection 2/2 COVID-19, hypotension on pressors;  likely ATN. UA active in the setting of UTI. FeNa 0.05% with hyaline cast; low EABV. s/p NS @ 100 cc/hr x24 hrs with no urine o/p. Consider Lasix 80mg IV x1. HepBsAg. pending Will defer Renal US due to COVID status.   Strict I/Os. Avoid nephrotoxins/ NSAIDs/ RCA. Monitor BMP.  2. Hyperkalemia- Recc Lokelma 10g via NGT q 8hrs x 48 hrs. Monitor lytes  3. Septic Shock due to Multifocal PNA 2/2 COVID-19/ UTI- Abx/ Pressors as per ICU. f/U BCx and UCx  4. Hypotension- Pressors as per ICU. Monitor BP  5. Acute hypoxic respiratory failure- vent support as per ICU.    Pt with anuric KVNG with septic shock and elevated LFTs with new acute CVAs but unable to a/c due to thrombocytopenia. Overall poor prognosis; recc GOC. Palliative following.  86 yo male with HTN on ARB, BPH, Dementia, CAD s/p CABG (2014) p/w SOB. Pt a/w acute hypoxic respiratory distress 2/2 COVID-19 PNA s/p intubated, Septic shock 2/2 COVID/ UTI, Hypotension, acute CVA, KVNG and hyperkalemia. Nephrology consulted for Elevated serum creatinine.    1. KVNG- unknown baseline SCr. KVNG likely hemodynamically mediated in the setting of septic shock / infection 2/2 COVID-19, hypotension on pressors;  likely ATN. UA active in the setting of UTI. FeNa 0.05% with hyaline cast; low EABV. s/p NS @ 100 cc/hr x24 hrs with no urine o/p. Consider Lasix 80mg IV x1. HepBsAg pending. Pt with thrombocytopenia; recc to check LDH and haptoglobin.  Will defer Renal US due to COVID status.   Strict I/Os. Avoid nephrotoxins/ NSAIDs/ RCA. Monitor BMP.  2. Hyperkalemia- Recc Lokelma 10g via NGT q 8hrs x 48 hrs. Monitor lytes  3. Septic Shock due to Multifocal PNA 2/2 COVID-19/ UTI- Abx/ Pressors as per ICU. f/U BCx and UCx  4. Hypotension- Pressors as per ICU. Monitor BP  5. Acute hypoxic respiratory failure- vent support as per ICU.    Pt with anuric KVNG with septic shock and elevated LFTs with new acute CVAs but unable to a/c due to thrombocytopenia. Overall poor prognosis; recc GOC. Palliative following.

## 2021-03-02 NOTE — PROGRESS NOTE ADULT - ASSESSMENT
· Assessment	  ASSESSMENT AND PLAN:  84 yo male with PMH of HTN, BPH, Dementia, CAD(Bypass in 2014) presented with SOB. Pt followed with PCP on Feb 19 for routine visit, was noted to have fever of 101, without any respiratory symptoms and he tested + for COVID. Today breathing was worse so EMS was called. Pt noted to be saturating in 30s on RA, improved to 90s on NRB but was unresponsive so was intubated in ED.     Assement:   - Acute Hypoxic respiratory failure due to COVID PNA, requiring mechanical ventilation  - septic shock  due to COVID and UTI   - Stroke   - KVNG   - Elevated D-dimer   - Hyperkalemia   -Transaminitis, elevated Bili   - Elevated Lactate   - Thrombocytopenia   - CAD   - HTN   - BPH   - Dementia     =================== Neuro============================  Alert and oriented x 0 on sedation, (Mental status base line AAO 3.)   Pt is on Propofol     Stroke:   Acute multifocal ischemic infarct   CT head; Right frontal lobe, left occipital lobe, left cerebellar hemisphere and left basal ganglia lucency suspicious for acute infarcts.   home med: aspirin and Plavix on hold due to thrombocytopenia  Lipitor  40 mg on hold due to Elevated LFT  Dr Rangel consulted.   Dementia: home med: Aricept on hold for now.       ================= Cardiovascular==========================  Septic shock  continue with Levophed    Troponemia   Trop of 111>90  f/u ECHO     CAD : s/p CABG in 2014.  Home med: Anti hypertensives coreg and Benicar on hold due to septic shock    ================- Pulm=================================  Acute Hypoxic respiratory failure due to COVID PNA, requiring mechanical ventilation  c/w [facility] vent   ABG showed ph 7.35, po2 of 341, PEEP 8 and Fio2 decreased from 100 to 60.  - Decadron IV 6mg daily   - Hold Remdesivir given elevated LFTs   - follow inflammatory markers     Elevated D-dimer, most likely PE/DVT   - unable to get CT angio chest due to Acute renal failure   US doppler LE negative for DVT   - given acute ischemic stroke, and thrombocytopenia  will not start heparin drip for now.  - Risk of bleed over weigh the benefit of heparin drip.       ==================ID===================================   Septic shock due to COVID and UTI   Afebrile, WBC: 15K, Lactate: 10>4, UA: positive.  Also, bilirubin noted to be 1.6, ALP/SGOT/SGPT: 99/1499/596   -ED course: 3.5L NS, IV Zosyn and Vanco   - trend lactate   - follow blood culture and Urine culture   -c/w Zosyn   -ID consult: Dr bean     ================= Nephro================================  - Acute renal failure   BUN/Creat: 91/5.83  Minimal urine output of 17ml overnight   HCO3 of 21  Most likely pre renal given shock   - s/p 3.5 l Ns bolus in Ed    Dr Kennedy  -    - Hyperkalemia   k: 5.9,   continue with lokelma  Follow repeat Labs     Hyperphosphatemia  P of 5.8      - h/o BPH: home med on hold   [place] in place     =================GI====================================  Transaminitis   Also, bilirubin noted to be 1.6, ALP/SGOT/SGPT: 99/1499/596   most likely due to shock   -f/u US Liver,    NPO for now      ================ Heme==================================  Thrombocytopenia   - Platelet: 50k>33k  D/d: COVID related, or other viral infection vs ITP vs TTP   - follow Hepatitis panel, ADAMTs   - monitor Platelet count and signs of bleeding      =================Endocrine===============================  No active issues   - NPO   Follow A1c   ================= Skin/Catheters============================  No rashes. Peripheral IV lines.   Central line RIJ     - =================Prophylaxis =============================   DVT proph: heparin   Protonix for  GI proph    ==================GOC==================================  Full code     Poor prognosis due to multi organ failure    ==================Disposition===============================  Pt accepted to ICU for further care

## 2021-03-02 NOTE — CONSULT NOTE ADULT - ATTENDING COMMENTS
I counseled the primary team about the medications to maintain for secondary stroke prevention in the setting of likely hypercoagulable state with COVID-19 infection.
[facility]  Robbie Lazo M.D.  Oliverio Anderson D.O.  Ann Marie Kennedy M.D.  Jacquelyn Mccarty, MSN, ANP-C  [phone]    [address] [place] [address]
86 yo with acute respiratory failure due to COVID-19 pneumonia with acute renal failure and anuria, acute cerebral infarcts, septic shock in setting of dementia and CAD. Pt is clinically declining, in multiorgan failure and dying. Family update and agreed that pt is dying and agreed for natural death with DNR and palliative extubation tomorrow

## 2021-03-02 NOTE — CONSULT NOTE ADULT - SUBJECTIVE AND OBJECTIVE BOX
HPI:  84 yo male with PMH of HTN, BPH, Dementia, CAD(Bypass in ) presented with SOB. Pt followed with PCP on  for routine visit, was noted to have fever of 101, without any respiratory symptoms and he tested + for COVID. Today breathing was worse so EMS was called. Pt noted to be saturating in 30s on RA, improved to 90s on NRB but was unresponsive so was intubated in ED.     Spoke to wife and daughter with Tajik , they were not able to provide medical history so HPI was obtained from Pt's PCP, Dr Manuel [phone].       GOC: Full code. Explained to wife that pt is critically sick and prognosis is poor using Tajik .  (01 Mar 2021 11:25)      PAST MEDICAL & SURGICAL HISTORY:  Dementia    BPH (benign prostatic hyperplasia)    HTN (hypertension)    CAD (coronary artery disease)    H/O heart bypass surgery          No Known Allergies      Meds:  chlorhexidine 0.12% Liquid 15 milliLiter(s) Oral Mucosa every 12 hours  chlorhexidine 2% Cloths 1 Application(s) Topical <User Schedule>  dexAMETHasone  Injectable 6 milliGRAM(s) IV Push daily  heparin   Injectable 5000 Unit(s) SubCutaneous every 8 hours  norepinephrine Infusion 0.5 MICROgram(s)/kG/Min IV Continuous <Continuous>  pantoprazole  Injectable 40 milliGRAM(s) IV Push daily  piperacillin/tazobactam IVPB.. 3.375 Gram(s) IV Intermittent every 12 hours  propofol Infusion 10.005 MICROgram(s)/kG/Min IV Continuous <Continuous>  sodium chloride 0.9% lock flush 10 milliLiter(s) IV Push every 1 hour PRN  sodium zirconium cyclosilicate 10 Gram(s) Oral every 8 hours      SOCIAL HISTORY:  Smoker:  YES / NO        PACK YEARS:                         WHEN QUIT?  ETOH use:  YES / NO               FREQUENCY / QUANTITY:  Ilicit Drug use:  YES / NO  Occupation:  Assisted device use (Cane / Walker):  Live with:    FAMILY HISTORY:  No pertinent family history in first degree relatives        VITALS:  Vital Signs Last 24 Hrs  T(C): 38.9 (02 Mar 2021 18:00), Max: 38.9 (02 Mar 2021 18:00)  T(F): 102.1 (02 Mar 2021 18:00), Max: 102.1 (02 Mar 2021 18:00)  HR: 125 (02 Mar 2021 19:00) (79 - 127)  BP: 98/65 (02 Mar 2021 19:00) (86/49 - 258/92)  BP(mean): 72 (02 Mar 2021 19:00) (25 - 150)  RR: 25 (02 Mar 2021 19:00) (20 - 34)  SpO2: 100% (02 Mar 2021 19:00) (97% - 100%)    LABS/DIAGNOSTIC TESTS:                          13.4   18.69 )-----------( 25       ( 02 Mar 2021 13:39 )             41.2     WBC Count: 18.69 K/uL ( @ 13:39)  WBC Count: 15.26 K/uL ( @ 06:35)  WBC Count: 14.67 K/uL ( @ 06:35)  WBC Count: 14.94 K/uL ( @ 20:49)  WBC Count: 12.48 K/uL ( @ 16:26)  WBC Count: 10.22 K/uL ( @ 10:18)          144  |  114<H>  |  97<H>  ----------------------------<  143<H>  5.5<H>   |  21<L>  |  6.25<H>    Ca    7.2<L>      02 Mar 2021 13:39  Phos  6.3     03-02  Mg     3.0     03-02    TPro  5.6<L>  /  Alb  1.7<L>  /  TBili  1.5<H>  /  DBili  x   /  AST  1013<H>  /  ALT  570<H>  /  AlkPhos  104  03-02      Urinalysis Basic - ( 01 Mar 2021 11:14 )    Color: Yellow / Appearance: Clear / S.015 / pH: x  Gluc: x / Ketone: Trace  / Bili: Negative / Urobili: 1   Blood: x / Protein: 100 / Nitrite: Positive   Leuk Esterase: Trace / RBC: 25-50 /HPF / WBC 3-5 /HPF   Sq Epi: x / Non Sq Epi: Few /HPF / Bacteria: Moderate /HPF        LIVER FUNCTIONS - ( 02 Mar 2021 13:39 )  Alb: 1.7 g/dL / Pro: 5.6 g/dL / ALK PHOS: 104 U/L / ALT: 570 U/L DA / AST: 1013 U/L / GGT: x             PT/INR - ( 02 Mar 2021 06:35 )   PT: 15.1 sec;   INR: 1.28 ratio         PTT - ( 02 Mar 2021 06:35 )  PTT:32.6 sec    LACTATE:Lactate, Blood: 2.4 mmol/L ( @ 13:39)  Lactate, Blood: 3.0 mmol/L ( @ 06:35)  Lactate, Blood: 4.0 mmol/L ( @ 00:06)  Lactate, Blood: 4.2 mmol/L ( @ 20:49)      ABG - ABG - ( 02 Mar 2021 03:58 )  pH, Arterial: 7.35  pH, Blood: x     /  pCO2: 32    /  pO2: 341   / HCO3: 18    / Base Excess: -6.6  /  SaO2: 99                  CULTURES:   .Urine Clean Catch (Midstream)   @ 19:10   No growth  --  --      .Blood Blood-Peripheral   @ 13:09   No growth to date.  --  --          RADIOLOGY:< from: US Hepatic & Pancreatic (21 @ 10:50) >  EXAM:  US LIVER AND PANCREAS                            PROCEDURE DATE:  2021          INTERPRETATION:  Indication: Concern for cholangitis.    Right upper quadrant ultrasound    Very limited portable study patient on ventilator, unable to offer full cooperation.    Gallbladder grossly unremarkable without stones or significant wall thickening. No biliary dilatation. Common duct 0.4 cm. The liver right kidney as visualized grossly unremarkable. Pancreas not adequately visualized.    Impression: Limited study. No biliary dilatation or gross acute pathology            HUMPHREY SALES MD; Attending Radiologist  This document has been electronically signed. Mar  2 2021 11:04AM    -------------------------------------------------------------------------------------------------------------------------------------------------------------------------------------------------------------------------------------------------------------------------------  EXAM:  XR CHEST PORTABLE ROUTINE 1V                            PROCEDURE DATE:  2021          INTERPRETATION:  Chest one view    HISTORY: Intubation    COMPARISON STUDY: 3/1/2021    Frontal expiratory view of the chest shows the heart to be normal in size. Endotracheal tube, feeding tube and right jugular line remain present.    The lungs show similar pulmonary infiltrates, right denser than left and there is no evidence of pneumothorax nor pleural effusion.    IMPRESSION:  Similar infiltrates.        Thank you for the courtesy of this referral.            BENNY OCHOA MD; Attending Interventional Radiologist  This document has been electronically signed. Mar  2 2021  1:31PM    ----------------------------------------------------------------------------------------------------------------------------------------------------------------------------------------------------  EXAM:  CT CHEST                            PROCEDURE DATE:  2021          INTERPRETATION:  CLINICAL INDICATION: 85 years  Male with PNA. Covid positive    COMPARISON: None.    PROCEDURE:  CT of the Chest was performed without intravenous contrast.  Sagittal and coronal reformats were performed.    FINDINGS:    DEVICES: ET tube tip in the mid trachea.    LUNGS AND AIRWAYS: Patent central airways.  Moderate centrilobular emphysema. Patchy bilateral groundglass infiltrates, more pronounced at the lung bases.  PLEURA: No pleural effusion.  MEDIASTINUM AND MATTY: No lymphadenopathy. Small sliding hiatus hernia.  VESSELS: Atherosclerotic aorta. Ectatic ascending aorta at 4.3 cm. Descending thoracic aorta at 2.8 cm.  HEART: Heart size is normal. CABG. No pericardial effusion.  CHEST WALL AND LOWER NECK: Median sternotomy.  VISUALIZED UPPER ABDOMEN: Within normal limits.  BONES: T10, T11, T12 and L1 compression fractures. Status post T11 and L1 vertebroplasty.    IMPRESSION:  Bilateral groundglass infiltrates suggestive of Covid pneumonia. Other pneumonia and pulmonary edema are not excluded.    Emphysema.    ET tube in satisfactory position.    Ectatic ascending aorta at 4.3 cm.              HARDIK MURPHY MD; Attending Radiologist  This document has been electronically signed. Mar  1 2021  2:22PM    < end of copied text >        ROS  [  ] UNABLE TO ELICIT               HPI:  84 yo male with PMH of HTN, BPH, Dementia, CAD(Bypass in ) presented with SOB. Pt followed with PCP on  for routine visit, was noted to have fever of 101, without any respiratory symptoms and he tested + for COVID. Today breathing was worse so EMS was called. Pt noted to be saturating in 30s on RA, improved to 90s on NRB but was unresponsive so was intubated in ED.   Spoke to wife and daughter with Indonesian , they were not able to provide medical history so HPI was obtained from Pt's PCP, Dr Manuel [phone].   GOC: Full code. Explained to wife that pt is critically sick and prognosis is poor using Indonesian .  (01 Mar 2021 11:25)      History as above, pt who is currently admitted here to the ICU with COVID Pneumonia and resp failure and septic shock , he is intubated , vent dependent and on pressor support, he is having high fevers , he is on decadron and zosyn but can not get Remdesivir because of his renal failure. He is doing very poorly overall and has shock liver as well.         PAST MEDICAL & SURGICAL HISTORY:  Dementia    BPH (benign prostatic hyperplasia)    HTN (hypertension)    CAD (coronary artery disease)    H/O heart bypass surgery          No Known Allergies      Meds:  chlorhexidine 0.12% Liquid 15 milliLiter(s) Oral Mucosa every 12 hours  chlorhexidine 2% Cloths 1 Application(s) Topical <User Schedule>  dexAMETHasone  Injectable 6 milliGRAM(s) IV Push daily  heparin   Injectable 5000 Unit(s) SubCutaneous every 8 hours  norepinephrine Infusion 0.5 MICROgram(s)/kG/Min IV Continuous <Continuous>  pantoprazole  Injectable 40 milliGRAM(s) IV Push daily  piperacillin/tazobactam IVPB.. 3.375 Gram(s) IV Intermittent every 12 hours  propofol Infusion 10.005 MICROgram(s)/kG/Min IV Continuous <Continuous>  sodium chloride 0.9% lock flush 10 milliLiter(s) IV Push every 1 hour PRN  sodium zirconium cyclosilicate 10 Gram(s) Oral every 8 hours      SOCIAL HISTORY: Unable to elicit    FAMILY HISTORY:  No pertinent family history in first degree relatives        VITALS:  Vital Signs Last 24 Hrs  T(C): 38.9 (02 Mar 2021 18:00), Max: 38.9 (02 Mar 2021 18:00)  T(F): 102.1 (02 Mar 2021 18:00), Max: 102.1 (02 Mar 2021 18:00)  HR: 125 (02 Mar 2021 19:00) (79 - 127)  BP: 98/65 (02 Mar 2021 19:00) (86/49 - 258/92)  BP(mean): 72 (02 Mar 2021 19:00) (25 - 150)  RR: 25 (02 Mar 2021 19:00) (20 - 34)  SpO2: 100% (02 Mar 2021 19:00) (97% - 100%)    LABS/DIAGNOSTIC TESTS:                          13.4   18.69 )-----------( 25       ( 02 Mar 2021 13:39 )             41.2     WBC Count: 18.69 K/uL ( @ 13:39)  WBC Count: 15.26 K/uL ( @ 06:35)  WBC Count: 14.67 K/uL ( @ 06:35)  WBC Count: 14.94 K/uL ( @ 20:49)  WBC Count: 12.48 K/uL ( @ 16:26)  WBC Count: 10.22 K/uL ( @ 10:18)      03    144  |  114<H>  |  97<H>  ----------------------------<  143<H>  5.5<H>   |  21<L>  |  6.25<H>    Ca    7.2<L>      02 Mar 2021 13:39  Phos  6.3     03-02  Mg     3.0     03-02    TPro  5.6<L>  /  Alb  1.7<L>  /  TBili  1.5<H>  /  DBili  x   /  AST  1013<H>  /  ALT  570<H>  /  AlkPhos  104  03-02      Urinalysis Basic - ( 01 Mar 2021 11:14 )    Color: Yellow / Appearance: Clear / S.015 / pH: x  Gluc: x / Ketone: Trace  / Bili: Negative / Urobili: 1   Blood: x / Protein: 100 / Nitrite: Positive   Leuk Esterase: Trace / RBC: 25-50 /HPF / WBC 3-5 /HPF   Sq Epi: x / Non Sq Epi: Few /HPF / Bacteria: Moderate /HPF        LIVER FUNCTIONS - ( 02 Mar 2021 13:39 )  Alb: 1.7 g/dL / Pro: 5.6 g/dL / ALK PHOS: 104 U/L / ALT: 570 U/L DA / AST: 1013 U/L / GGT: x             PT/INR - ( 02 Mar 2021 06:35 )   PT: 15.1 sec;   INR: 1.28 ratio         PTT - ( 02 Mar 2021 06:35 )  PTT:32.6 sec    LACTATE:Lactate, Blood: 2.4 mmol/L ( @ 13:39)  Lactate, Blood: 3.0 mmol/L ( @ 06:35)  Lactate, Blood: 4.0 mmol/L ( @ 00:06)  Lactate, Blood: 4.2 mmol/L ( @ 20:49)      ABG - ABG - ( 02 Mar 2021 03:58 )  pH, Arterial: 7.35  pH, Blood: x     /  pCO2: 32    /  pO2: 341   / HCO3: 18    / Base Excess: -6.6  /  SaO2: 99                  CULTURES:   .Urine Clean Catch (Midstream)   @ 19:10   No growth  --  --      .Blood Blood-Peripheral   @ 13:09   No growth to date.  --  --          RADIOLOGY:< from: US Hepatic & Pancreatic (21 @ 10:50) >  EXAM:  US LIVER AND PANCREAS                            PROCEDURE DATE:  2021          INTERPRETATION:  Indication: Concern for cholangitis.    Right upper quadrant ultrasound    Very limited portable study patient on ventilator, unable to offer full cooperation.    Gallbladder grossly unremarkable without stones or significant wall thickening. No biliary dilatation. Common duct 0.4 cm. The liver right kidney as visualized grossly unremarkable. Pancreas not adequately visualized.    Impression: Limited study. No biliary dilatation or gross acute pathology            HUMPHREY SALES MD; Attending Radiologist  This document has been electronically signed. Mar  2 2021 11:04AM    -------------------------------------------------------------------------------------------------------------------------------------------------------------------------------------------------------------------------------------------------------------------------------  EXAM:  XR CHEST PORTABLE ROUTINE 1V                            PROCEDURE DATE:  2021          INTERPRETATION:  Chest one view    HISTORY: Intubation    COMPARISON STUDY: 3/1/2021    Frontal expiratory view of the chest shows the heart to be normal in size. Endotracheal tube, feeding tube and right jugular line remain present.    The lungs show similar pulmonary infiltrates, right denser than left and there is no evidence of pneumothorax nor pleural effusion.    IMPRESSION:  Similar infiltrates.        Thank you for the courtesy of this referral.            BENNY OCHOA MD; Attending Interventional Radiologist  This document has been electronically signed. Mar  2 2021  1:31PM    ----------------------------------------------------------------------------------------------------------------------------------------------------------------------------------------------------  EXAM:  CT CHEST                            PROCEDURE DATE:  2021          INTERPRETATION:  CLINICAL INDICATION: 85 years  Male with PNA. Covid positive    COMPARISON: None.    PROCEDURE:  CT of the Chest was performed without intravenous contrast.  Sagittal and coronal reformats were performed.    FINDINGS:    DEVICES: ET tube tip in the mid trachea.    LUNGS AND AIRWAYS: Patent central airways.  Moderate centrilobular emphysema. Patchy bilateral groundglass infiltrates, more pronounced at the lung bases.  PLEURA: No pleural effusion.  MEDIASTINUM AND MATTY: No lymphadenopathy. Small sliding hiatus hernia.  VESSELS: Atherosclerotic aorta. Ectatic ascending aorta at 4.3 cm. Descending thoracic aorta at 2.8 cm.  HEART: Heart size is normal. CABG. No pericardial effusion.  CHEST WALL AND LOWER NECK: Median sternotomy.  VISUALIZED UPPER ABDOMEN: Within normal limits.  BONES: T10, T11, T12 and L1 compression fractures. Status post T11 and L1 vertebroplasty.    IMPRESSION:  Bilateral groundglass infiltrates suggestive of Covid pneumonia. Other pneumonia and pulmonary edema are not excluded.    Emphysema.    ET tube in satisfactory position.    Ectatic ascending aorta at 4.3 cm.              HARDIK MURPHY MD; Attending Radiologist  This document has been electronically signed. Mar  1 2021  2:22PM    < end of copied text >        ROS  [ x ] UNABLE TO ELICIT

## 2021-03-02 NOTE — CONSULT NOTE ADULT - PROBLEM SELECTOR RECOMMENDATION 5
2/2 acute and chronic illness. Patient with albumin 1.7, + skin failure. Remains NPO 2/2 critical illness. High risk of skin breakdown.

## 2021-03-02 NOTE — CONSULT NOTE ADULT - CONVERSATION DETAILS
Long discussion with wife (2nd wife) and son in shared common native language (Macedonian) about pt's declining condition as below and likely that pt is dying. After discussing risks and benefits of continued treatment vs DNR with or without palliative extubation, wife deferred the final decisions to the children (she is the stepmother to them). There are 4 daughters and 1 son. The son is the primary surrogate. The son discussed with his siblings and decided on DNR. Agreed for palliative extubation which will be done tomorrow after the children visit [facility].   Wife is also sick with COVID and unable to visit.   She requested [facility] for anointing of the sick sacrament.    Support and counseling given.

## 2021-03-02 NOTE — CONSULT NOTE ADULT - CONSULT REASON
Elevated serum creatinine.
NSTEMI
Septic Shock, fevers, COVID Pneumonia, Leukocytosis
Stroke
84 y/o M with MOF 2/2 covid, on pressor, KVNG, shock liver. High risk of mortality. Request to establish GOC.

## 2021-03-02 NOTE — ADVANCED PRACTICE NURSE CONSULT - ASSESSMENT
Outpatient Record - [place]    Bottlefeed your infant every 3-4 hours and /or on demand.  Your infant should eat at least 6-8 times in a 24-hour period.  Call the infant's physician if you are having problems waking your infant for feeds.    Your baby's weight today was 9 lb 7.3 oz (4288 g).    Follow up / Make appointment with Dr. Reeder ([facility]) - call tomorrow to be seen this week in clinic.    Call your baby's physician with any questions or concerns at:   [facility] [phone] [place] [phone] [place] [phone]      Call the lactation office with any breastfeeding questions or concerns at [phone].    A  infant should have at least 6-8 wet diapers and 4 bowel movements in 24 hours.  A bottlefed infant should have at least 6-8 wet diapers and 1 bowel movement in 24 hours.  Call your infant's physician if your infant is not meeting these guidelines.    Parent signature ____________________________________ date/time ____________________________   This is a 85yr old male patient admitted for Respiratory Failure, presenting with an intact DTI to the Mid Upper Spine and Sacrococcyx area without drainage This is a 85yr old male patient admitted for Respiratory Failure, presenting with the following:  -There is an intact DTI to the Mid Upper Spine and Sacrococcyx area without drainage  -There is a Stage 1 Pressure Injury to the Bilateral Heels, as evident by non-blanchable erythema

## 2021-03-02 NOTE — ADVANCED PRACTICE NURSE CONSULT - RECOMMEDATIONS
-Clean the Mid Upper Spine and Sacrococcyx area with normal saline and apply skin prep to the surrounding skin  -Apply Calcium Alginate (Aquacel) to the wound and cover with a Foam dressing Q 72hrs PRN  -Elevate/float the patients heels using heel protectors and reposition the patient Q 2hrs using wedges or pillows

## 2021-03-02 NOTE — PROGRESS NOTE ADULT - SUBJECTIVE AND OBJECTIVE BOX
INTERVAL HPI/OVERNIGHT EVENTS: Pt is intubated and sedated.     PRESSORS: [x ] YES [ ] NO  WHICH:        Antimicrobial:  piperacillin/tazobactam IVPB.. 3.375 Gram(s) IV Intermittent every 12 hours    Cardiovascular:  norepinephrine Infusion 0.5 MICROgram(s)/kG/Min IV Continuous <Continuous>    Pulmonary:    Hematalogic:  heparin   Injectable 5000 Unit(s) SubCutaneous every 8 hours    Other:  acetaminophen  Suppository .. 650 milliGRAM(s) Rectal every 6 hours PRN  chlorhexidine 0.12% Liquid 15 milliLiter(s) Oral Mucosa every 12 hours  chlorhexidine 2% Cloths 1 Application(s) Topical <User Schedule>  dexAMETHasone  Injectable 6 milliGRAM(s) IV Push daily  pantoprazole  Injectable 40 milliGRAM(s) IV Push daily  propofol Infusion 10.005 MICROgram(s)/kG/Min IV Continuous <Continuous>  sodium chloride 0.9% lock flush 10 milliLiter(s) IV Push every 1 hour PRN  sodium chloride 0.9%. 1000 milliLiter(s) IV Continuous <Continuous>  sodium zirconium cyclosilicate 10 Gram(s) Oral every 8 hours    acetaminophen  Suppository .. 650 milliGRAM(s) Rectal every 6 hours PRN  chlorhexidine 0.12% Liquid 15 milliLiter(s) Oral Mucosa every 12 hours  chlorhexidine 2% Cloths 1 Application(s) Topical <User Schedule>  dexAMETHasone  Injectable 6 milliGRAM(s) IV Push daily  heparin   Injectable 5000 Unit(s) SubCutaneous every 8 hours  norepinephrine Infusion 0.5 MICROgram(s)/kG/Min IV Continuous <Continuous>  pantoprazole  Injectable 40 milliGRAM(s) IV Push daily  piperacillin/tazobactam IVPB.. 3.375 Gram(s) IV Intermittent every 12 hours  propofol Infusion 10.005 MICROgram(s)/kG/Min IV Continuous <Continuous>  sodium chloride 0.9% lock flush 10 milliLiter(s) IV Push every 1 hour PRN  sodium chloride 0.9%. 1000 milliLiter(s) IV Continuous <Continuous>  sodium zirconium cyclosilicate 10 Gram(s) Oral every 8 hours    Drug Dosing Weight    Weight (kg): 64.3 (01 Mar 2021 15:15)    CENTRAL LINE: [x ] YES [ ] NO  LOCATION: 3/2  DATE INSERTED: RIJ   REMOVE: [ ] YES [ ] NO  EXPLAIN:    MATA: [X ] YES [ ] NO    DATE INSERTED:  REMOVE:  [ ] YES [ ] NO  EXPLAIN:    A-LINE:  [ ] YES [X ] NO  LOCATION:   DATE INSERTED:  REMOVE:  [ ] YES [ ] NO  EXPLAIN:         ICU Vital Signs Last 24 Hrs  T(C): 37.3 (02 Mar 2021 08:00), Max: 39.1 (01 Mar 2021 10:29)  T(F): 99.2 (02 Mar 2021 08:00), Max: 102.4 (01 Mar 2021 10:29)  HR: 98 (02 Mar 2021 10:15) (79 - 127)  BP: 103/55 (02 Mar 2021 10:00) (61/35 - 258/92)  BP(mean): 68 (02 Mar 2021 10:00) (25 - 150)  ABP: --  ABP(mean): --  RR: 27 (02 Mar 2021 10:15) (18 - 36)  SpO2: 98% (02 Mar 2021 10:15) (91% - 100%)      ABG - ( 02 Mar 2021 03:58 )  pH, Arterial: 7.35  pH, Blood: x     /  pCO2: 32    /  pO2: 341   / HCO3: 18    / Base Excess: -6.6  /  SaO2: 99                    03 @ 07:01  -  03-02 @ 07:00  --------------------------------------------------------  IN: 1640.6 mL / OUT: 10 mL / NET: 1630.6 mL        Mode: AC/ CMV (Assist Control/ Continuous Mandatory Ventilation)  RR (machine): 20  TV (machine): 450  FiO2: 60  PEEP: 8  ITime: 0.8  MAP: 13  PIP: 28      PHYSICAL EXAM:  GENERAL: pt in bed, intubated   HEENT: Normocephalic;  conjunctivae and sclerae clear; moist mucous membranes; Pupils are 3mm and sluggishly reactive  NECK : supple, RIJ in place, NGT in place   CHEST/LUNG: bilateral breath sounds present, on mechanical ventilation, bilateral basilar crackles present,   HEART: S1, S2 heard, no murmurs, gallops  ABDOMEN: Soft, Nontender, Nondistended; Bowel sounds present  EXTREMITIES: no cyanosis; no edema; no calf tenderness  Genitourinary Mata in place.  SKIN: Stage 2 sacral ulcer is present  NERVOUS SYSTEM:  AAO x0, sedated       LABS:  CBC Full  -  ( 02 Mar 2021 06:35 )  WBC Count : 15.26 K/uL  RBC Count : 4.24 M/uL  Hemoglobin : 13.0 g/dL  Hematocrit : 40.4 %  Platelet Count - Automated : 33 K/uL  Mean Cell Volume : 95.3 fl  Mean Cell Hemoglobin : 30.7 pg  Mean Cell Hemoglobin Concentration : 32.2 gm/dL  Auto Neutrophil # : 13.49 K/uL  Auto Lymphocyte # : 1.07 K/uL  Auto Monocyte # : 0.30 K/uL  Auto Eosinophil # : 0.00 K/uL  Auto Basophil # : 0.04 K/uL  Auto Neutrophil % : 88.3 %  Auto Lymphocyte % : 7.0 %  Auto Monocyte % : 2.0 %  Auto Eosinophil % : 0.0 %  Auto Basophil % : 0.3 %    03-02    145  |  115<H>  |  91<H>  ----------------------------<  158<H>  5.9<H>   |  21<L>  |  5.53<H>    Ca    7.5<L>      02 Mar 2021 06:35  Phos  5.5     03-02  Mg     3.1     03-02    TPro  5.7<L>  /  Alb  1.7<L>  /  TBili  1.6<H>  /  DBili  0.9<H>  /  AST  1499<H>  /  ALT  596<H>  /  AlkPhos  99  03-02    PT/INR - ( 02 Mar 2021 06:35 )   PT: 15.1 sec;   INR: 1.28 ratio         PTT - ( 02 Mar 2021 06:35 )  PTT:32.6 sec  Urinalysis Basic - ( 01 Mar 2021 11:14 )    Color: Yellow / Appearance: Clear / S.015 / pH: x  Gluc: x / Ketone: Trace  / Bili: Negative / Urobili: 1   Blood: x / Protein: 100 / Nitrite: Positive   Leuk Esterase: Trace / RBC: 25-50 /HPF / WBC 3-5 /HPF   Sq Epi: x / Non Sq Epi: Few /HPF / Bacteria: Moderate /HPF          RADIOLOGY & ADDITIONAL STUDIES REVIEWED:  ***    [ ]GOALS OF CARE DISCUSSION WITH PATIENT/FAMILY/PROXY:    CRITICAL CARE TIME SPENT: 35 minutes

## 2021-03-02 NOTE — CONSULT NOTE ADULT - SUBJECTIVE AND OBJECTIVE BOX
HPI:  86 yo male with PMH of HTN, BPH, Dementia, CAD(Bypass in ) presented with SOB. Pt followed with PCP on  for routine visit, was noted to have fever of 101, without any respiratory symptoms and he tested + for COVID. Today breathing was worse so EMS was called. Pt noted to be saturating in 30s on RA, improved to 90s on NRB but was unresponsive so was intubated in ED.     Spoke to wife and daughter with Khmer , they were not able to provide medical history so HPI was obtained from Pt's PCP, Dr Manuel [phone].     GOC: Full code. Explained to wife that pt is critically sick and prognosis is poor using Khmer .  (01 Mar 2021 11:25)    Brief hospital course:  Patient transferred to ICU - remains sedated/intubated, on pressor, +shock/MOF 2/2 COVID-19. CT head noted multiple ischemic strokes likely secondary to hypercoagulable state, secondary to COVID-19 infection as per neuruo. High risk of mortality. Request to establish goals of care.      PAST MEDICAL & SURGICAL HISTORY:  Dementia  BPH (benign prostatic hyperplasia)  HTN (hypertension)  CAD (coronary artery disease)  H/O heart bypass surgery      SOCIAL HISTORY:    Admitted from:  home assisted living [facility]   Substance abuse history/   Tobacco hx/      Alcohol hx:   unable to obtain 2/2 mental status           Home Opioid hx: none  Mosque:   n/a                                 Preferred Language: Khmer    Surrogate/HCP/Guardian:  Mrs. Thornton (wife): [phone]    FAMILY HISTORY:  No pertinent family history in first degree relatives      Baseline ADLs (prior to admission):     No Known Allergies    Present Symptoms:      Review of Systems:  Unable to obtain due to poor mentation    MEDICATIONS  (STANDING):  chlorhexidine 0.12% Liquid 15 milliLiter(s) Oral Mucosa every 12 hours  dexAMETHasone  Injectable 6 milliGRAM(s) IV Push daily  heparin   Injectable 5000 Unit(s) SubCutaneous every 8 hours  norepinephrine Infusion 0.5 MICROgram(s)/kG/Min (30.1 mL/Hr) IV Continuous <Continuous>  pantoprazole  Injectable 40 milliGRAM(s) IV Push daily  piperacillin/tazobactam IVPB.. 3.375 Gram(s) IV Intermittent every 12 hours  propofol Infusion 10.005 MICROgram(s)/kG/Min (3.86 mL/Hr) IV Continuous <Continuous>  sodium chloride 0.9%. 1000 milliLiter(s) (100 mL/Hr) IV Continuous <Continuous>  sodium zirconium cyclosilicate 10 Gram(s) Oral every 8 hours    MEDICATIONS  (PRN):  acetaminophen  Suppository .. 650 milliGRAM(s) Rectal every 6 hours PRN Temp greater or equal to 38C (100.4F)  sodium chloride 0.9% lock flush 10 milliLiter(s) IV Push every 1 hour PRN Pre/post blood products, medications, blood draw, and to maintain line patency      PHYSICAL EXAM:    Vital Signs Last 24 Hrs  T(C): 37.3 (02 Mar 2021 08:00), Max: 39.1 (01 Mar 2021 10:29)  T(F): 99.2 (02 Mar 2021 08:00), Max: 102.4 (01 Mar 2021 10:29)  HR: 98 (02 Mar 2021 10:00) (79 - 127)  BP: 103/55 (02 Mar 2021 10:00) (61/35 - 258/92)  BP(mean): 68 (02 Mar 2021 10:00) (25 - 150)  RR: 27 (02 Mar 2021 10:00) (18 - 36)  SpO2: 98% (02 Mar 2021 10:00) (91% - 100%)    General: Patient not medically stable for full physical exam 2/2 covid-19 status. Patient sedated/intubated, on pressor.   Karnofsky Performance Score/Palliative Performance Status Version2:     10%    HEENT: ET tube  Lungs:   tachypnea, on ventilator. Continues propofol, decadron  CV:   tachycardia, on pressor  GI:   incontinent  :   jasso  Musculoskeletal: patient sedated/intubated, dependent on ADLs  Skin: DTI mid upper spine and sarococcyx, stage 1 b/l heels as per wound care notes  Neuro: dementia at baseline; patient sedated/intubated, dependent on ADLs   Oral intake ability: unable/only mouth care  Diet: NPO    LABS:                        13.0   15.26 )-----------( 33       ( 02 Mar 2021 06:35 )             40.4     03-02    145  |  115<H>  |  91<H>  ----------------------------<  158<H>  5.9<H>   |  21<L>  |  5.53<H>    Ca    7.5<L>      02 Mar 2021 06:35  Phos  5.5     03-  Mg     3.1     -    TPro  5.7<L>  /  Alb  1.7<L>  /  TBili  1.6<H>  /  DBili  0.9<H>  /  AST  1499<H>  /  ALT  596<H>  /  AlkPhos  99  03-    Urinalysis Basic - ( 01 Mar 2021 11:14 )    Color: Yellow / Appearance: Clear / S.015 / pH: x  Gluc: x / Ketone: Trace  / Bili: Negative / Urobili: 1   Blood: x / Protein: 100 / Nitrite: Positive   Leuk Esterase: Trace / RBC: 25-50 /HPF / WBC 3-5 /HPF   Sq Epi: x / Non Sq Epi: Few /HPF / Bacteria: Moderate /HPF    RADIOLOGY & ADDITIONAL STUDIES:  < from: CT Chest No Cont (21 @ 14:07) >  EXAM:  CT CHEST                        PROCEDURE DATE:  2021    INTERPRETATION:  CLINICAL INDICATION: 85 years  Male with PNA. Covid positive  COMPARISON: None.  PROCEDURE:  CT of the Chest was performed without intravenous contrast.  Sagittal and coronal reformats were performed.  FINDINGS:  DEVICES: ET tube tip in the mid trachea.  LUNGS AND AIRWAYS: Patent central airways.  Moderate centrilobular emphysema. Patchy bilateral groundglass infiltrates, more pronounced at the lung bases.  PLEURA: No pleural effusion.  MEDIASTINUM AND MATTY: No lymphadenopathy. Small sliding hiatus hernia.  VESSELS: Atherosclerotic aorta. Ectatic ascending aorta at 4.3 cm. Descending thoracic aorta at 2.8 cm.  HEART: Heart size is normal. CABG. No pericardial effusion.  CHEST WALL AND LOWER NECK: Median sternotomy.  VISUALIZED UPPER ABDOMEN: Within normal limits.  BONES: T10, T11, T12 and L1 compression fractures. Status post T11 and L1 vertebroplasty.    IMPRESSION:  Bilateral groundglass infiltrates suggestive of Covid pneumonia. Other pneumonia and pulmonary edema are not excluded.  Emphysema.  ET tube in satisfactory position.  Ectatic ascending aorta at 4.3 cm.  < end of copied text >    < from: CT Head No Cont (21 @ 14:06) >  EXAM:  CT BRAIN                        PROCEDURE DATE:  2021    INTERPRETATION:  Noncontrast CT of the brain.  CLINICAL INDICATION:  Altered mental status, Covid  TECHNIQUE : Axial CT scanning of the brain was obtained from the skull base to the vertex without the administration of intravenous contrast.  COMPARISON: None available  FINDINGS:  No acute hemorrhage, hydrocephalus, midline shift or extra-axial collections are present. Age-appropriate involutional changes andmild microvascular ischemic changes are present.  Areas of lucency are noted within the right frontal lobe, left occipital lobe, left cerebellar hemisphere and left basal ganglia which are suspicious for acute infarcts.  The orbits are not remarkable in appearance. Bilateral lens replacement is noted.  Other visualized paranasal sinuses and tympanomastoid cavities are free of acute disease.    IMPRESSION:  Right frontal lobe, left occipital lobe, left cerebellar hemisphere and left basal ganglia lucency suspicious for acute infarcts. Follow-up brain MRI is recommended for further evaluation.  No acute hemorrhage.  < end of copied text >    < from: Xray Chest 1 View-PORTABLE IMMEDIATE (Xray Chest 1 View-PORTABLE IMMEDIATE .) (21 @ 16:41) >  EXAM:  XR CHEST PORTABLE IMMED 1V                        PROCEDURE DATE:  2021    INTERPRETATION:  Portable chest radiograph  CLINICAL INFORMATION: Line placement  TECHNIQUE:  Portable  AP view of the chest was obtained.  COMPARISON: 3/1/2021 chest available for review.  FINDINGS: ET tube tip above tracheal bifurcation.  NG tube tip beyond GE junction.  RIGHT IJ catheter tip in SVC.  The lungs show similar RIGHT greater than LEFT diffuse airspace disease.. No pneumothorax.  The heart and mediastinum are within normal limits.  Visualized osseous structures are intact.    IMPRESSION:   ET tube tip above tracheal bifurcation.  NG tube tip beyond GE junction.  RIGHT IJ catheter tip in SVC.  RIGHT greater than LEFT diffuse airspace disease.  < end of copied text >    < from: US Duplex Venous Lower Ext Complete, Bilateral (21 @ 17:41) >  EXAM:  US DPLX LWR EXT VEINS COMPL BI                        PROCEDURE DATE:  2021    INTERPRETATION:  CLINICAL INFORMATION: Hypoxia and elevated d-dimer level. COVID positive patient.  COMPARISON: None available.  TECHNIQUE: Duplex sonography of the BILATERAL LOWER extremity veins with color and spectral Doppler, with and without compression. COVID technique.  FINDINGS:  There is normal compressibility of the bilateral common femoral, femoral and popliteal veins.  Doppler examination shows normal spontaneous and phasic flow.  No calf vein thrombosis is detected.    IMPRESSION:  No evidence of deep venous thrombosis in either lower extremity.  < end of copied text >      ADVANCE DIRECTIVES:    HPI:  86 yo male with PMH of HTN, BPH, Dementia, CAD(Bypass in ) presented with SOB. Pt followed with PCP on  for routine visit, was noted to have fever of 101, without any respiratory symptoms and he tested + for COVID. Today breathing was worse so EMS was called. Pt noted to be saturating in 30s on RA, improved to 90s on NRB but was unresponsive so was intubated in ED.     Spoke to wife and daughter with Persian , they were not able to provide medical history so HPI was obtained from Pt's PCP, Dr Manuel [phone].     GOC: Full code. Explained to wife that pt is critically sick and prognosis is poor using Persian .  (01 Mar 2021 11:25)    Brief hospital course:  Patient transferred to ICU - remains sedated/intubated, on pressor, +shock/MOF involving lungs/heart/kidneys/liver 2/2 COVID-19. No urine output. CT head noted multiple ischemic strokes likely secondary to hypercoagulable state, secondary to COVID-19 infection as per neuro. K5.9. High risk of mortality. Request to establish goals of care.      PAST MEDICAL & SURGICAL HISTORY:  Dementia  BPH (benign prostatic hyperplasia)  HTN (hypertension)  CAD (coronary artery disease)  H/O heart bypass surgery     SOCIAL HISTORY:    Admitted from:  home   Substance abuse history/   Tobacco hx/      Alcohol hx:   unable to obtain 2/2 mental status           Home Opioid hx: none  Confucianism:   n/a                                 Preferred Language: Persian    Surrogate: Patrick Thornton (son): [phone]    FAMILY HISTORY:  No pertinent family history in first degree relatives    Baseline ADLs (prior to admission): Limited information available - patient with dementia; on aricept at home    No Known Allergies    Present Symptoms:      Review of Systems:  Unable to obtain due to poor mentation    MEDICATIONS  (STANDING):  chlorhexidine 0.12% Liquid 15 milliLiter(s) Oral Mucosa every 12 hours  dexAMETHasone  Injectable 6 milliGRAM(s) IV Push daily  heparin   Injectable 5000 Unit(s) SubCutaneous every 8 hours  norepinephrine Infusion 0.5 MICROgram(s)/kG/Min (30.1 mL/Hr) IV Continuous <Continuous>  pantoprazole  Injectable 40 milliGRAM(s) IV Push daily  piperacillin/tazobactam IVPB.. 3.375 Gram(s) IV Intermittent every 12 hours  propofol Infusion 10.005 MICROgram(s)/kG/Min (3.86 mL/Hr) IV Continuous <Continuous>  sodium chloride 0.9%. 1000 milliLiter(s) (100 mL/Hr) IV Continuous <Continuous>  sodium zirconium cyclosilicate 10 Gram(s) Oral every 8 hours    MEDICATIONS  (PRN):  acetaminophen  Suppository .. 650 milliGRAM(s) Rectal every 6 hours PRN Temp greater or equal to 38C (100.4F)  sodium chloride 0.9% lock flush 10 milliLiter(s) IV Push every 1 hour PRN Pre/post blood products, medications, blood draw, and to maintain line patency    PHYSICAL EXAM:    Vital Signs Last 24 Hrs  T(C): 37.3 (02 Mar 2021 08:00), Max: 39.1 (01 Mar 2021 10:29)  T(F): 99.2 (02 Mar 2021 08:00), Max: 102.4 (01 Mar 2021 10:29)  HR: 98 (02 Mar 2021 10:00) (79 - 127)  BP: 103/55 (02 Mar 2021 10:00) (61/35 - 258/92)  BP(mean): 68 (02 Mar 2021 10:00) (25 - 150)  RR: 27 (02 Mar 2021 10:00) (18 - 36)  SpO2: 98% (02 Mar 2021 10:00) (91% - 100%)    General: Patient not medically stable for full physical exam 2/2 covid-19 status. Patient sedated/intubated, on pressor.   Karnofsky Performance Score/Palliative Performance Status Version2:     10%    HEENT: ET tube  Lungs:   tachypnea, on ventilator. Continues propofol, decadron  CV:   tachycardia, on pressor  GI:   incontinent  :   jasso  Musculoskeletal: patient sedated/intubated, dependent on ADLs  Skin: DTI mid upper spine and sacrococcyx, stage 1 b/l heels as per wound care notes  Neuro: dementia at baseline; patient sedated/intubated, dependent on ADLs   Oral intake ability: unable/only mouth care  Diet: NPO    LABS:                        13.0   15.26 )-----------( 33       ( 02 Mar 2021 06:35 )             40.4     03-02    145  |  115<H>  |  91<H>  ----------------------------<  158<H>  5.9<H>   |  21<L>  |  5.53<H>    Ca    7.5<L>      02 Mar 2021 06:35  Phos  5.5     03-  Mg     3.1     03-    TPro  5.7<L>  /  Alb  1.7<L>  /  TBili  1.6<H>  /  DBili  0.9<H>  /  AST  1499<H>  /  ALT  596<H>  /  AlkPhos  99  03-    Urinalysis Basic - ( 01 Mar 2021 11:14 )    Color: Yellow / Appearance: Clear / S.015 / pH: x  Gluc: x / Ketone: Trace  / Bili: Negative / Urobili: 1   Blood: x / Protein: 100 / Nitrite: Positive   Leuk Esterase: Trace / RBC: 25-50 /HPF / WBC 3-5 /HPF   Sq Epi: x / Non Sq Epi: Few /HPF / Bacteria: Moderate /HPF    RADIOLOGY & ADDITIONAL STUDIES:  < from: CT Chest No Cont (21 @ 14:07) >  EXAM:  CT CHEST                        PROCEDURE DATE:  2021    INTERPRETATION:  CLINICAL INDICATION: 85 years  Male with PNA. Covid positive  COMPARISON: None.  PROCEDURE:  CT of the Chest was performed without intravenous contrast.  Sagittal and coronal reformats were performed.  FINDINGS:  DEVICES: ET tube tip in the mid trachea.  LUNGS AND AIRWAYS: Patent central airways.  Moderate centrilobular emphysema. Patchy bilateral groundglass infiltrates, more pronounced at the lung bases.  PLEURA: No pleural effusion.  MEDIASTINUM AND MATTY: No lymphadenopathy. Small sliding hiatus hernia.  VESSELS: Atherosclerotic aorta. Ectatic ascending aorta at 4.3 cm. Descending thoracic aorta at 2.8 cm.  HEART: Heart size is normal. CABG. No pericardial effusion.  CHEST WALL AND LOWER NECK: Median sternotomy.  VISUALIZED UPPER ABDOMEN: Within normal limits.  BONES: T10, T11, T12 and L1 compression fractures. Status post T11 and L1 vertebroplasty.    IMPRESSION:  Bilateral groundglass infiltrates suggestive of Covid pneumonia. Other pneumonia and pulmonary edema are not excluded.  Emphysema.  ET tube in satisfactory position.  Ectatic ascending aorta at 4.3 cm.  < end of copied text >    < from: CT Head No Cont (21 @ 14:06) >  EXAM:  CT BRAIN                        PROCEDURE DATE:  2021    INTERPRETATION:  Noncontrast CT of the brain.  CLINICAL INDICATION:  Altered mental status, Covid  TECHNIQUE : Axial CT scanning of the brain was obtained from the skull base to the vertex without the administration of intravenous contrast.  COMPARISON: None available  FINDINGS:  No acute hemorrhage, hydrocephalus, midline shift or extra-axial collections are present. Age-appropriate involutional changes andmild microvascular ischemic changes are present.  Areas of lucency are noted within the right frontal lobe, left occipital lobe, left cerebellar hemisphere and left basal ganglia which are suspicious for acute infarcts.  The orbits are not remarkable in appearance. Bilateral lens replacement is noted.  Other visualized paranasal sinuses and tympanomastoid cavities are free of acute disease.    IMPRESSION:  Right frontal lobe, left occipital lobe, left cerebellar hemisphere and left basal ganglia lucency suspicious for acute infarcts. Follow-up brain MRI is recommended for further evaluation.  No acute hemorrhage.  < end of copied text >    < from: Xray Chest 1 View-PORTABLE IMMEDIATE (Xray Chest 1 View-PORTABLE IMMEDIATE .) (21 @ 16:41) >  EXAM:  XR CHEST PORTABLE IMMED 1V                        PROCEDURE DATE:  2021    INTERPRETATION:  Portable chest radiograph  CLINICAL INFORMATION: Line placement  TECHNIQUE:  Portable  AP view of the chest was obtained.  COMPARISON: 3/1/2021 chest available for review.  FINDINGS: ET tube tip above tracheal bifurcation.  NG tube tip beyond GE junction.  RIGHT IJ catheter tip in SVC.  The lungs show similar RIGHT greater than LEFT diffuse airspace disease.. No pneumothorax.  The heart and mediastinum are within normal limits.  Visualized osseous structures are intact.    IMPRESSION:   ET tube tip above tracheal bifurcation.  NG tube tip beyond GE junction.  RIGHT IJ catheter tip in SVC.  RIGHT greater than LEFT diffuse airspace disease.  < end of copied text >    < from: US Duplex Venous Lower Ext Complete, Bilateral (21 @ 17:41) >  EXAM:  US DPLX LWR EXT VEINS COMPL BI                        PROCEDURE DATE:  2021    INTERPRETATION:  CLINICAL INFORMATION: Hypoxia and elevated d-dimer level. COVID positive patient.  COMPARISON: None available.  TECHNIQUE: Duplex sonography of the BILATERAL LOWER extremity veins with color and spectral Doppler, with and without compression. COVID technique.  FINDINGS:  There is normal compressibility of the bilateral common femoral, femoral and popliteal veins.  Doppler examination shows normal spontaneous and phasic flow.  No calf vein thrombosis is detected.    IMPRESSION:  No evidence of deep venous thrombosis in either lower extremity.  < end of copied text >      ADVANCE DIRECTIVES:    HPI:  86 yo male with PMH of HTN, BPH, Dementia, CAD(Bypass in ) presented with SOB. Pt followed with PCP on  for routine visit, was noted to have fever of 101, without any respiratory symptoms and he tested + for COVID. Today breathing was worse so EMS was called. Pt noted to be saturating in 30s on RA, improved to 90s on NRB but was unresponsive so was intubated in ED.     Spoke to wife and daughter with Kinyarwanda , they were not able to provide medical history so HPI was obtained from Pt's PCP, Dr Manuel [phone].     GOC: Full code. Explained to wife that pt is critically sick and prognosis is poor using Kinyarwanda .  (01 Mar 2021 11:25)    Brief hospital course:  Patient transferred to ICU - remains sedated/intubated, on pressor, +shock/MOF involving lungs/heart/kidneys/liver 2/2 COVID-19. No urine output. CT head noted multiple ischemic strokes likely secondary to hypercoagulable state, secondary to COVID-19 infection as per neuro. K5.9. High risk of mortality. Request to establish goals of care.      PAST MEDICAL & SURGICAL HISTORY:  Dementia  BPH (benign prostatic hyperplasia)  HTN (hypertension)  CAD (coronary artery disease)  H/O heart bypass surgery     SOCIAL HISTORY:    Admitted from:  home   Substance abuse history/   Tobacco hx/      Alcohol hx:   unable to obtain 2/2 mental status           Home Opioid hx: none  Orthodox:   n/a                                 Preferred Language: Kinyarwanda    Surrogate: Patrick Thornton (son): [phone]    FAMILY HISTORY:  No pertinent family history in first degree relatives    Baseline ADLs (prior to admission): Limited information available - patient with dementia; on aricept at home    No Known Allergies    Present Symptoms:      Review of Systems:  Unable to obtain due to poor mentation    MEDICATIONS  (STANDING):  chlorhexidine 0.12% Liquid 15 milliLiter(s) Oral Mucosa every 12 hours  dexAMETHasone  Injectable 6 milliGRAM(s) IV Push daily  heparin   Injectable 5000 Unit(s) SubCutaneous every 8 hours  norepinephrine Infusion 0.5 MICROgram(s)/kG/Min (30.1 mL/Hr) IV Continuous <Continuous>  pantoprazole  Injectable 40 milliGRAM(s) IV Push daily  piperacillin/tazobactam IVPB.. 3.375 Gram(s) IV Intermittent every 12 hours  propofol Infusion 10.005 MICROgram(s)/kG/Min (3.86 mL/Hr) IV Continuous <Continuous>  sodium chloride 0.9%. 1000 milliLiter(s) (100 mL/Hr) IV Continuous <Continuous>  sodium zirconium cyclosilicate 10 Gram(s) Oral every 8 hours    MEDICATIONS  (PRN):  acetaminophen  Suppository .. 650 milliGRAM(s) Rectal every 6 hours PRN Temp greater or equal to 38C (100.4F)  sodium chloride 0.9% lock flush 10 milliLiter(s) IV Push every 1 hour PRN Pre/post blood products, medications, blood draw, and to maintain line patency    PHYSICAL EXAM:    Vital Signs Last 24 Hrs  T(C): 37.3 (02 Mar 2021 08:00), Max: 39.1 (01 Mar 2021 10:29)  T(F): 99.2 (02 Mar 2021 08:00), Max: 102.4 (01 Mar 2021 10:29)  HR: 98 (02 Mar 2021 10:00) (79 - 127)  BP: 103/55 (02 Mar 2021 10:00) (61/35 - 258/92)  BP(mean): 68 (02 Mar 2021 10:00) (25 - 150)  RR: 27 (02 Mar 2021 10:00) (18 - 36)  SpO2: 98% (02 Mar 2021 10:00) (91% - 100%)    General: Patient not medically stable for full physical exam 2/2 covid-19 status. Patient sedated/intubated, on pressor.   Karnofsky Performance Score/Palliative Performance Status Version2:     10%    HEENT: ET tube  Lungs:   tachypnea, on ventilator. Continues propofol, decadron  CV:   tachycardia, on pressor  GI:   incontinent  :  anuric, jasso  Musculoskeletal: patient sedated/intubated, dependent on ADLs  Skin: DTI mid upper spine and sacrococcyx, stage 1 b/l heels as per wound care notes  Neuro: dementia at baseline; patient sedated/intubated, dependent on ADLs   Oral intake ability: unable/only mouth care  Diet: NPO    LABS:                        13.0   15.26 )-----------( 33       ( 02 Mar 2021 06:35 )             40.4     03-02    145  |  115<H>  |  91<H>  ----------------------------<  158<H>  5.9<H>   |  21<L>  |  5.53<H>    Ca    7.5<L>      02 Mar 2021 06:35  Phos  5.5     03-  Mg     3.1     03-    TPro  5.7<L>  /  Alb  1.7<L>  /  TBili  1.6<H>  /  DBili  0.9<H>  /  AST  1499<H>  /  ALT  596<H>  /  AlkPhos  99  03-    Urinalysis Basic - ( 01 Mar 2021 11:14 )    Color: Yellow / Appearance: Clear / S.015 / pH: x  Gluc: x / Ketone: Trace  / Bili: Negative / Urobili: 1   Blood: x / Protein: 100 / Nitrite: Positive   Leuk Esterase: Trace / RBC: 25-50 /HPF / WBC 3-5 /HPF   Sq Epi: x / Non Sq Epi: Few /HPF / Bacteria: Moderate /HPF    RADIOLOGY & ADDITIONAL STUDIES:  < from: CT Chest No Cont (21 @ 14:07) >  EXAM:  CT CHEST                        PROCEDURE DATE:  2021    INTERPRETATION:  CLINICAL INDICATION: 85 years  Male with PNA. Covid positive  COMPARISON: None.  PROCEDURE:  CT of the Chest was performed without intravenous contrast.  Sagittal and coronal reformats were performed.  FINDINGS:  DEVICES: ET tube tip in the mid trachea.  LUNGS AND AIRWAYS: Patent central airways.  Moderate centrilobular emphysema. Patchy bilateral groundglass infiltrates, more pronounced at the lung bases.  PLEURA: No pleural effusion.  MEDIASTINUM AND MATTY: No lymphadenopathy. Small sliding hiatus hernia.  VESSELS: Atherosclerotic aorta. Ectatic ascending aorta at 4.3 cm. Descending thoracic aorta at 2.8 cm.  HEART: Heart size is normal. CABG. No pericardial effusion.  CHEST WALL AND LOWER NECK: Median sternotomy.  VISUALIZED UPPER ABDOMEN: Within normal limits.  BONES: T10, T11, T12 and L1 compression fractures. Status post T11 and L1 vertebroplasty.    IMPRESSION:  Bilateral groundglass infiltrates suggestive of Covid pneumonia. Other pneumonia and pulmonary edema are not excluded.  Emphysema.  ET tube in satisfactory position.  Ectatic ascending aorta at 4.3 cm.  < end of copied text >    < from: CT Head No Cont (21 @ 14:06) >  EXAM:  CT BRAIN                        PROCEDURE DATE:  2021    INTERPRETATION:  Noncontrast CT of the brain.  CLINICAL INDICATION:  Altered mental status, Covid  TECHNIQUE : Axial CT scanning of the brain was obtained from the skull base to the vertex without the administration of intravenous contrast.  COMPARISON: None available  FINDINGS:  No acute hemorrhage, hydrocephalus, midline shift or extra-axial collections are present. Age-appropriate involutional changes andmild microvascular ischemic changes are present.  Areas of lucency are noted within the right frontal lobe, left occipital lobe, left cerebellar hemisphere and left basal ganglia which are suspicious for acute infarcts.  The orbits are not remarkable in appearance. Bilateral lens replacement is noted.  Other visualized paranasal sinuses and tympanomastoid cavities are free of acute disease.    IMPRESSION:  Right frontal lobe, left occipital lobe, left cerebellar hemisphere and left basal ganglia lucency suspicious for acute infarcts. Follow-up brain MRI is recommended for further evaluation.  No acute hemorrhage.  < end of copied text >    < from: Xray Chest 1 View-PORTABLE IMMEDIATE (Xray Chest 1 View-PORTABLE IMMEDIATE .) (21 @ 16:41) >  EXAM:  XR CHEST PORTABLE IMMED 1V                        PROCEDURE DATE:  2021    INTERPRETATION:  Portable chest radiograph  CLINICAL INFORMATION: Line placement  TECHNIQUE:  Portable  AP view of the chest was obtained.  COMPARISON: 3/1/2021 chest available for review.  FINDINGS: ET tube tip above tracheal bifurcation.  NG tube tip beyond GE junction.  RIGHT IJ catheter tip in SVC.  The lungs show similar RIGHT greater than LEFT diffuse airspace disease.. No pneumothorax.  The heart and mediastinum are within normal limits.  Visualized osseous structures are intact.    IMPRESSION:   ET tube tip above tracheal bifurcation.  NG tube tip beyond GE junction.  RIGHT IJ catheter tip in SVC.  RIGHT greater than LEFT diffuse airspace disease.  < end of copied text >    < from: US Duplex Venous Lower Ext Complete, Bilateral (21 @ 17:41) >  EXAM:  US DPLX LWR EXT VEINS COMPL BI                        PROCEDURE DATE:  2021    INTERPRETATION:  CLINICAL INFORMATION: Hypoxia and elevated d-dimer level. COVID positive patient.  COMPARISON: None available.  TECHNIQUE: Duplex sonography of the BILATERAL LOWER extremity veins with color and spectral Doppler, with and without compression. COVID technique.  FINDINGS:  There is normal compressibility of the bilateral common femoral, femoral and popliteal veins.  Doppler examination shows normal spontaneous and phasic flow.  No calf vein thrombosis is detected.    IMPRESSION:  No evidence of deep venous thrombosis in either lower extremity.  < end of copied text >      ADVANCE DIRECTIVES:  full code   HPI:  84 yo male with PMH of HTN, BPH, Dementia, CAD(Bypass in ) presented with SOB. Pt followed with PCP on  for routine visit, was noted to have fever of 101, without any respiratory symptoms and he tested + for COVID. Today breathing was worse so EMS was called. Pt noted to be saturating in 30s on RA, improved to 90s on NRB but was unresponsive so was intubated in ED.     Spoke to wife and daughter with [place] , they were not able to provide medical history so HPI was obtained from Pt's PCP, Dr Manuel [phone].     Brief hospital course:  Patient transferred to ICU - remains sedated/intubated, on pressor, +shock/MOF involving lungs/heart/kidneys/liver 2/2 COVID-19. No urine output. CT head noted multiple ischemic strokes likely secondary to hypercoagulable state, secondary to COVID-19 infection as per neuro. K5.9. High risk of mortality. Request to establish goals of care.      PAST MEDICAL & SURGICAL HISTORY:  Dementia  BPH (benign prostatic hyperplasia)  HTN (hypertension)  CAD (coronary artery disease)  H/O heart bypass surgery     SOCIAL HISTORY:    Admitted from:  home   Substance abuse history/   Tobacco hx/      Alcohol hx:   unable to obtain 2/2 mental status           Home Opioid hx: none  Lutheran:   n/a                                 Preferred Language: Georgian    Surrogate: Patrick Thornton (son): [phone]    FAMILY HISTORY:  No pertinent family history in first degree relatives    Baseline ADLs (prior to admission): Limited information available - patient with dementia; on aricept at home    No Known Allergies    Present Symptoms:      Review of Systems:  Unable to obtain due to poor mentation    MEDICATIONS  (STANDING):  chlorhexidine 0.12% Liquid 15 milliLiter(s) Oral Mucosa every 12 hours  dexAMETHasone  Injectable 6 milliGRAM(s) IV Push daily  heparin   Injectable 5000 Unit(s) SubCutaneous every 8 hours  norepinephrine Infusion 0.5 MICROgram(s)/kG/Min (30.1 mL/Hr) IV Continuous <Continuous>  pantoprazole  Injectable 40 milliGRAM(s) IV Push daily  piperacillin/tazobactam IVPB.. 3.375 Gram(s) IV Intermittent every 12 hours  propofol Infusion 10.005 MICROgram(s)/kG/Min (3.86 mL/Hr) IV Continuous <Continuous>  sodium chloride 0.9%. 1000 milliLiter(s) (100 mL/Hr) IV Continuous <Continuous>  sodium zirconium cyclosilicate 10 Gram(s) Oral every 8 hours    MEDICATIONS  (PRN):  acetaminophen  Suppository .. 650 milliGRAM(s) Rectal every 6 hours PRN Temp greater or equal to 38C (100.4F)  sodium chloride 0.9% lock flush 10 milliLiter(s) IV Push every 1 hour PRN Pre/post blood products, medications, blood draw, and to maintain line patency    PHYSICAL EXAM:    Vital Signs Last 24 Hrs  T(C): 37.3 (02 Mar 2021 08:00), Max: 39.1 (01 Mar 2021 10:29)  T(F): 99.2 (02 Mar 2021 08:00), Max: 102.4 (01 Mar 2021 10:29)  HR: 98 (02 Mar 2021 10:00) (79 - 127)  BP: 103/55 (02 Mar 2021 10:00) (61/35 - 258/92)  BP(mean): 68 (02 Mar 2021 10:00) (25 - 150)  RR: 27 (02 Mar 2021 10:00) (18 - 36)  SpO2: 98% (02 Mar 2021 10:00) (91% - 100%)    General: Patient not medically stable for full physical exam 2/2 covid-19 status. Patient sedated/intubated, on pressor.   Karnofsky Performance Score/Palliative Performance Status Version2:     10%    HEENT: ET tube  Lungs:   tachypnea, on ventilator. Continues propofol, decadron  CV:   tachycardia, on pressor  GI:   incontinent  :  anuric, jasso  Musculoskeletal: patient sedated/intubated, dependent on ADLs  Skin: DTI mid upper spine and sacrococcyx, stage 1 b/l heels as per wound care notes  Neuro: dementia at baseline; patient sedated/intubated, dependent on ADLs   Oral intake ability: unable/only mouth care  Diet: NPO    LABS:                        13.0   15.26 )-----------( 33       ( 02 Mar 2021 06:35 )             40.4     03-02    145  |  115<H>  |  91<H>  ----------------------------<  158<H>  5.9<H>   |  21<L>  |  5.53<H>    Ca    7.5<L>      02 Mar 2021 06:35  Phos  5.5     03-  Mg     3.1     -    TPro  5.7<L>  /  Alb  1.7<L>  /  TBili  1.6<H>  /  DBili  0.9<H>  /  AST  1499<H>  /  ALT  596<H>  /  AlkPhos  99  03-    Urinalysis Basic - ( 01 Mar 2021 11:14 )    Color: Yellow / Appearance: Clear / S.015 / pH: x  Gluc: x / Ketone: Trace  / Bili: Negative / Urobili: 1   Blood: x / Protein: 100 / Nitrite: Positive   Leuk Esterase: Trace / RBC: 25-50 /HPF / WBC 3-5 /HPF   Sq Epi: x / Non Sq Epi: Few /HPF / Bacteria: Moderate /HPF    RADIOLOGY & ADDITIONAL STUDIES:  < from: CT Chest No Cont (21 @ 14:07) >  EXAM:  CT CHEST                        PROCEDURE DATE:  2021    INTERPRETATION:  CLINICAL INDICATION: 85 years  Male with PNA. Covid positive  COMPARISON: None.  PROCEDURE:  CT of the Chest was performed without intravenous contrast.  Sagittal and coronal reformats were performed.  FINDINGS:  DEVICES: ET tube tip in the mid trachea.  LUNGS AND AIRWAYS: Patent central airways.  Moderate centrilobular emphysema. Patchy bilateral groundglass infiltrates, more pronounced at the lung bases.  PLEURA: No pleural effusion.  MEDIASTINUM AND MATTY: No lymphadenopathy. Small sliding hiatus hernia.  VESSELS: Atherosclerotic aorta. Ectatic ascending aorta at 4.3 cm. Descending thoracic aorta at 2.8 cm.  HEART: Heart size is normal. CABG. No pericardial effusion.  CHEST WALL AND LOWER NECK: Median sternotomy.  VISUALIZED UPPER ABDOMEN: Within normal limits.  BONES: T10, T11, T12 and L1 compression fractures. Status post T11 and L1 vertebroplasty.    IMPRESSION:  Bilateral groundglass infiltrates suggestive of Covid pneumonia. Other pneumonia and pulmonary edema are not excluded.  Emphysema.  ET tube in satisfactory position.  Ectatic ascending aorta at 4.3 cm.  < end of copied text >    < from: CT Head No Cont (21 @ 14:06) >  EXAM:  CT BRAIN                        PROCEDURE DATE:  2021    INTERPRETATION:  Noncontrast CT of the brain.  CLINICAL INDICATION:  Altered mental status, Covid  TECHNIQUE : Axial CT scanning of the brain was obtained from the skull base to the vertex without the administration of intravenous contrast.  COMPARISON: None available  FINDINGS:  No acute hemorrhage, hydrocephalus, midline shift or extra-axial collections are present. Age-appropriate involutional changes andmild microvascular ischemic changes are present.  Areas of lucency are noted within the right frontal lobe, left occipital lobe, left cerebellar hemisphere and left basal ganglia which are suspicious for acute infarcts.  The orbits are not remarkable in appearance. Bilateral lens replacement is noted.  Other visualized paranasal sinuses and tympanomastoid cavities are free of acute disease.    IMPRESSION:  Right frontal lobe, left occipital lobe, left cerebellar hemisphere and left basal ganglia lucency suspicious for acute infarcts. Follow-up brain MRI is recommended for further evaluation.  No acute hemorrhage.  < end of copied text >    < from: Xray Chest 1 View-PORTABLE IMMEDIATE (Xray Chest 1 View-PORTABLE IMMEDIATE .) (21 @ 16:41) >  EXAM:  XR CHEST PORTABLE IMMED 1V                        PROCEDURE DATE:  2021    INTERPRETATION:  Portable chest radiograph  CLINICAL INFORMATION: Line placement  TECHNIQUE:  Portable  AP view of the chest was obtained.  COMPARISON: 3/1/2021 chest available for review.  FINDINGS: ET tube tip above tracheal bifurcation.  NG tube tip beyond GE junction.  RIGHT IJ catheter tip in SVC.  The lungs show similar RIGHT greater than LEFT diffuse airspace disease.. No pneumothorax.  The heart and mediastinum are within normal limits.  Visualized osseous structures are intact.    IMPRESSION:   ET tube tip above tracheal bifurcation.  NG tube tip beyond GE junction.  RIGHT IJ catheter tip in SVC.  RIGHT greater than LEFT diffuse airspace disease.  < end of copied text >    < from: US Duplex Venous Lower Ext Complete, Bilateral (21 @ 17:41) >  EXAM:  US DPLX LWR EXT VEINS COMPL BI                        PROCEDURE DATE:  2021    INTERPRETATION:  CLINICAL INFORMATION: Hypoxia and elevated d-dimer level. COVID positive patient.  COMPARISON: None available.  TECHNIQUE: Duplex sonography of the BILATERAL LOWER extremity veins with color and spectral Doppler, with and without compression. COVID technique.  FINDINGS:  There is normal compressibility of the bilateral common femoral, femoral and popliteal veins.  Doppler examination shows normal spontaneous and phasic flow.  No calf vein thrombosis is detected.    IMPRESSION:  No evidence of deep venous thrombosis in either lower extremity.  < end of copied text >      ADVANCE DIRECTIVES:  full code   HPI:  86 yo male with PMH of HTN, BPH, Dementia, CAD(Bypass in ) presented with SOB. Pt followed with PCP on  for routine visit, was noted to have fever of 101, without any respiratory symptoms and he tested + for COVID. Today breathing was worse so EMS was called. Pt noted to be saturating in 30s on RA, improved to 90s on NRB but was unresponsive so was intubated in ED.     Spoke to wife and daughter with [place] , they were not able to provide medical history so HPI was obtained from Pt's PCP, Dr Manuel [phone].     Brief hospital course:  Patient transferred to ICU - remains sedated/intubated, on pressor, +shock/MOF involving lungs/heart/kidneys/liver 2/2 COVID-19. No urine output. CT head noted multiple ischemic strokes likely secondary to hypercoagulable state, secondary to COVID-19 infection as per neuro. K5.9. High risk of mortality. Request to establish goals of care.      PAST MEDICAL & SURGICAL HISTORY:  Dementia  BPH (benign prostatic hyperplasia)  HTN (hypertension)  CAD (coronary artery disease)  H/O heart bypass surgery     SOCIAL HISTORY:    Admitted from:  home   Substance abuse history/   Tobacco hx/      Alcohol hx:   unable to obtain 2/2 mental status           Home Opioid hx: none  Sikhism:  Moravian                                Preferred Language: Yi    Surrogate: Patrick Thornton (son): [phone]    FAMILY HISTORY:  No pertinent family history in first degree relatives    Baseline ADLs (prior to admission): Limited information available - patient with dementia; on aricept at home    No Known Allergies    Present Symptoms:      Review of Systems:  Unable to obtain due to poor mentation    MEDICATIONS  (STANDING):  chlorhexidine 0.12% Liquid 15 milliLiter(s) Oral Mucosa every 12 hours  dexAMETHasone  Injectable 6 milliGRAM(s) IV Push daily  heparin   Injectable 5000 Unit(s) SubCutaneous every 8 hours  norepinephrine Infusion 0.5 MICROgram(s)/kG/Min (30.1 mL/Hr) IV Continuous <Continuous>  pantoprazole  Injectable 40 milliGRAM(s) IV Push daily  piperacillin/tazobactam IVPB.. 3.375 Gram(s) IV Intermittent every 12 hours  propofol Infusion 10.005 MICROgram(s)/kG/Min (3.86 mL/Hr) IV Continuous <Continuous>  sodium chloride 0.9%. 1000 milliLiter(s) (100 mL/Hr) IV Continuous <Continuous>  sodium zirconium cyclosilicate 10 Gram(s) Oral every 8 hours    MEDICATIONS  (PRN):  acetaminophen  Suppository .. 650 milliGRAM(s) Rectal every 6 hours PRN Temp greater or equal to 38C (100.4F)  sodium chloride 0.9% lock flush 10 milliLiter(s) IV Push every 1 hour PRN Pre/post blood products, medications, blood draw, and to maintain line patency    PHYSICAL EXAM:    Vital Signs Last 24 Hrs  T(C): 37.3 (02 Mar 2021 08:00), Max: 39.1 (01 Mar 2021 10:29)  T(F): 99.2 (02 Mar 2021 08:00), Max: 102.4 (01 Mar 2021 10:29)  HR: 98 (02 Mar 2021 10:00) (79 - 127)  BP: 103/55 (02 Mar 2021 10:00) (61/35 - 258/92)  BP(mean): 68 (02 Mar 2021 10:00) (25 - 150)  RR: 27 (02 Mar 2021 10:00) (18 - 36)  SpO2: 98% (02 Mar 2021 10:00) (91% - 100%)    General: Patient not medically stable for full physical exam 2/2 covid-19 status. Patient sedated/intubated, on pressor.   Karnofsky Performance Score/Palliative Performance Status Version2:     10%    HEENT: ET tube  Lungs:   tachypnea, on ventilator. Continues propofol, decadron  CV:   tachycardia, on pressor  GI:   incontinent  :  anuric, jasso  Musculoskeletal: patient sedated/intubated, dependent on ADLs  Skin: DTI mid upper spine and sacrococcyx, stage 1 b/l heels as per wound care notes  Neuro: dementia at baseline; patient sedated/intubated, dependent on ADLs   Oral intake ability: unable/only mouth care  Diet: NPO    LABS:                        13.0   15.26 )-----------( 33       ( 02 Mar 2021 06:35 )             40.4     03-02    145  |  115<H>  |  91<H>  ----------------------------<  158<H>  5.9<H>   |  21<L>  |  5.53<H>    Ca    7.5<L>      02 Mar 2021 06:35  Phos  5.5     03-  Mg     3.1     -    TPro  5.7<L>  /  Alb  1.7<L>  /  TBili  1.6<H>  /  DBili  0.9<H>  /  AST  1499<H>  /  ALT  596<H>  /  AlkPhos  99  03-    Urinalysis Basic - ( 01 Mar 2021 11:14 )    Color: Yellow / Appearance: Clear / S.015 / pH: x  Gluc: x / Ketone: Trace  / Bili: Negative / Urobili: 1   Blood: x / Protein: 100 / Nitrite: Positive   Leuk Esterase: Trace / RBC: 25-50 /HPF / WBC 3-5 /HPF   Sq Epi: x / Non Sq Epi: Few /HPF / Bacteria: Moderate /HPF    RADIOLOGY & ADDITIONAL STUDIES:  < from: CT Chest No Cont (21 @ 14:07) >  EXAM:  CT CHEST                        PROCEDURE DATE:  2021    INTERPRETATION:  CLINICAL INDICATION: 85 years  Male with PNA. Covid positive  COMPARISON: None.  PROCEDURE:  CT of the Chest was performed without intravenous contrast.  Sagittal and coronal reformats were performed.  FINDINGS:  DEVICES: ET tube tip in the mid trachea.  LUNGS AND AIRWAYS: Patent central airways.  Moderate centrilobular emphysema. Patchy bilateral groundglass infiltrates, more pronounced at the lung bases.  PLEURA: No pleural effusion.  MEDIASTINUM AND MATTY: No lymphadenopathy. Small sliding hiatus hernia.  VESSELS: Atherosclerotic aorta. Ectatic ascending aorta at 4.3 cm. Descending thoracic aorta at 2.8 cm.  HEART: Heart size is normal. CABG. No pericardial effusion.  CHEST WALL AND LOWER NECK: Median sternotomy.  VISUALIZED UPPER ABDOMEN: Within normal limits.  BONES: T10, T11, T12 and L1 compression fractures. Status post T11 and L1 vertebroplasty.    IMPRESSION:  Bilateral groundglass infiltrates suggestive of Covid pneumonia. Other pneumonia and pulmonary edema are not excluded.  Emphysema.  ET tube in satisfactory position.  Ectatic ascending aorta at 4.3 cm.  < end of copied text >    < from: CT Head No Cont (21 @ 14:06) >  EXAM:  CT BRAIN                        PROCEDURE DATE:  2021    INTERPRETATION:  Noncontrast CT of the brain.  CLINICAL INDICATION:  Altered mental status, Covid  TECHNIQUE : Axial CT scanning of the brain was obtained from the skull base to the vertex without the administration of intravenous contrast.  COMPARISON: None available  FINDINGS:  No acute hemorrhage, hydrocephalus, midline shift or extra-axial collections are present. Age-appropriate involutional changes andmild microvascular ischemic changes are present.  Areas of lucency are noted within the right frontal lobe, left occipital lobe, left cerebellar hemisphere and left basal ganglia which are suspicious for acute infarcts.  The orbits are not remarkable in appearance. Bilateral lens replacement is noted.  Other visualized paranasal sinuses and tympanomastoid cavities are free of acute disease.    IMPRESSION:  Right frontal lobe, left occipital lobe, left cerebellar hemisphere and left basal ganglia lucency suspicious for acute infarcts. Follow-up brain MRI is recommended for further evaluation.  No acute hemorrhage.  < end of copied text >    < from: Xray Chest 1 View-PORTABLE IMMEDIATE (Xray Chest 1 View-PORTABLE IMMEDIATE .) (21 @ 16:41) >  EXAM:  XR CHEST PORTABLE IMMED 1V                        PROCEDURE DATE:  2021    INTERPRETATION:  Portable chest radiograph  CLINICAL INFORMATION: Line placement  TECHNIQUE:  Portable  AP view of the chest was obtained.  COMPARISON: 3/1/2021 chest available for review.  FINDINGS: ET tube tip above tracheal bifurcation.  NG tube tip beyond GE junction.  RIGHT IJ catheter tip in SVC.  The lungs show similar RIGHT greater than LEFT diffuse airspace disease.. No pneumothorax.  The heart and mediastinum are within normal limits.  Visualized osseous structures are intact.    IMPRESSION:   ET tube tip above tracheal bifurcation.  NG tube tip beyond GE junction.  RIGHT IJ catheter tip in SVC.  RIGHT greater than LEFT diffuse airspace disease.  < end of copied text >    < from: US Duplex Venous Lower Ext Complete, Bilateral (21 @ 17:41) >  EXAM:  US DPLX LWR EXT VEINS COMPL BI                        PROCEDURE DATE:  2021    INTERPRETATION:  CLINICAL INFORMATION: Hypoxia and elevated d-dimer level. COVID positive patient.  COMPARISON: None available.  TECHNIQUE: Duplex sonography of the BILATERAL LOWER extremity veins with color and spectral Doppler, with and without compression. COVID technique.  FINDINGS:  There is normal compressibility of the bilateral common femoral, femoral and popliteal veins.  Doppler examination shows normal spontaneous and phasic flow.  No calf vein thrombosis is detected.    IMPRESSION:  No evidence of deep venous thrombosis in either lower extremity.  < end of copied text >      ADVANCE DIRECTIVES:  full code

## 2021-03-03 NOTE — PROGRESS NOTE ADULT - SUBJECTIVE AND OBJECTIVE BOX
Remains intubated on vent support.  ROS unobtainable   	  MEDICATIONS:  MEDICATIONS  (STANDING):  HYDROmorphone  Injectable 0.5 milliGRAM(s) IV Push every 4 hours  midazolam Injectable 2 milliGRAM(s) IV Push every 6 hours      LABS:	 	    CARDIAC MARKERS:  CARDIAC MARKERS ( 02 Mar 2021 06:55 )  90.900 ng/mL / x     / x     / x     / x      CARDIAC MARKERS ( 02 Mar 2021 00:07 )  111.000 ng/mL / x     / 1330 U/L / x     / 97.7 ng/mL  CARDIAC MARKERS ( 01 Mar 2021 20:49 )  115 ng/mL / x     / 1489 U/L / x     / 102.2 ng/mL  CARDIAC MARKERS ( 01 Mar 2021 16:27 )  110.000 ng/mL / x     / 1658 U/L / x     / 107.6 ng/mL                                13.4   18.69 )-----------( 25       ( 02 Mar 2021 13:39 )             41.2     Hemoglobin: 13.4 g/dL (03-02 @ 13:39)  Hemoglobin: 13.0 g/dL (03-02 @ 06:35)  Hemoglobin: 13.1 g/dL (03-02 @ 06:35)  Hemoglobin: 13.5 g/dL (03-01 @ 20:49)  Hemoglobin: 13.8 g/dL (03-01 @ 16:26)      03-02    144  |  114<H>  |  97<H>  ----------------------------<  143<H>  5.5<H>   |  21<L>  |  6.25<H>    Ca    7.2<L>      02 Mar 2021 13:39  Phos  6.3     03-02  Mg     3.0     03-02    TPro  5.6<L>  /  Alb  1.7<L>  /  TBili  1.5<H>  /  DBili  x   /  AST  1013<H>  /  ALT  570<H>  /  AlkPhos  104  03-02    Creatinine Trend: 6.25<--, 5.53<--, 4.94<--, 4.62<--, 4.14<--, 3.55<--    COAGS:       proBNP:   Lipid Profile:   HgA1c:   TSH:       PHYSICAL EXAM:  T(C): 37.1 (03-03-21 @ 09:45), Max: 40.2 (03-03-21 @ 00:00)  HR: 110 (03-03-21 @ 10:15) (70 - 130)  BP: 72/45 (03-03-21 @ 10:15) (65/37 - 134/63)  RR: 20 (03-03-21 @ 10:15) (19 - 26)  SpO2: 100% (03-03-21 @ 10:00) (95% - 100%)  Wt(kg): --  I&O's Summary    02 Mar 2021 07:01  -  03 Mar 2021 07:00  --------------------------------------------------------  IN: 2012.6 mL / OUT: 15 mL / NET: 1997.6 mL    03 Mar 2021 07:01  -  03 Mar 2021 11:58  --------------------------------------------------------  IN: 120.3 mL / OUT: 0 mL / NET: 120.3 mL      HEENT:  (-)icterus (-)pallor  CV: N S1 S2 1/6 MARCIO (+)2 Pulses B/l  Resp:  Clear to ausculatation B/L, normal effort  GI: (+) BS Soft, NT, ND  Lymph:  (-)Edema, (-)obvious lymphadenopathy  Skin: Warm to touch, Normal turgor  Psych:  unable to asses mood and affect      TELEMETRY: 	  sinus        ASSESSMENT/PLAN: 	85y  Male PMH of HTN, BPH, Dementia, CAD (Bypass in 2014) presented with SOB. Pt followed with PCP on Feb 19 for routine visit, was noted to have fever of 101, without any respiratory symptoms and he tested + for COVID. Today breathing was worse so EMS was called. Pt noted to be saturating in 30s on RA, improved to 90s on NRB but was unresponsive so was intubated in ED.     - NSTEMI, likely type II MI in the setting of profound hypoxia  - Unable to anticoagulate due to dangerously low Platelet levels  - KVNG, renal f/u  - COVID treatment per MICU  - cont supportive care  - Prognosis appears poor, multiorgan failure  palliative f/u to help with GOC    Michael Simmons MD, [facility]  BEEPER [phone]

## 2021-03-03 NOTE — DISCHARGE NOTE FOR THE EXPIRED PATIENT - ORGAN DONOR #
[id number] High Dose Vitamin A Counseling: Side effects reviewed, pt to contact office should one occur.

## 2021-03-03 NOTE — PROGRESS NOTE ADULT - ATTENDING COMMENTS
[facility] NEPHROLOGY  Robbie Lazo M.D.  Oliverio Anderson D.O.  Ann Marie Kennedy M.D.  Jacquelyn Mccarty, MSN, ANP-C  [phone]    [address] [place] [address]
I have counseled the primary team about the medication to maintain to help with secondary stroke prevention in the setting of thrombocytopenia.
Wife updated about clinical condition with Ukrainian  ID #[id number]. Requested to speak with son Patrick Thornton ([phone]). Explained guarded prognosis given multiple system organ failure.     Assessment:  1. Septic shock  2. Acute hypoxic respiratory failure  3. Hyperbilirubinemia   4. Acute kidney injury   5. Lactic acidosis  6. Acute Multifocal ischemic cerebral infarcts   7. Thrombocytopenia   8. Transaminitis  9. Coronary artery disease  10. Dementia   11. NSTEMI     Plan  - Patient on mechanical ventilation with progressive multiple system ogran failure, including hepatic dysfunction, renal failure, shock, respiratory failure. Creatine is uptrending, inducing hyperkalemia. At this time not a candidate for HD. Palliative care consulted with plan for no further escalation of care and comfort measures only.  - Plan for withdrawal of care tomorrow
Wife updated about clinical condition with Estonian  ID #[id number]. Requested to speak with son Patrick Thornton ([phone]). Explained guarded prognosis given multiple system organ failure.     Assessment:  1. Septic shock  2. Acute hypoxic respiratory failure  3. Hyperbilirubinemia   4. Acute kidney injury   5. Lactic acidosis  6. Acute Multifocal ischemic cerebral infarcts   7. Thrombocytopenia   8. Transaminitis  9. Coronary artery disease  10. Dementia   11. NSTEMI     Plan  - Plan for withdrawal of care today, patient  soon after.

## 2021-03-03 NOTE — DISCHARGE NOTE FOR THE EXPIRED PATIENT - HOSPITAL COURSE
84 yo male with PMH of HTN, BPH, Dementia, CAD(Bypass in 2014) presented with SOB. Pt followed with PCP on Feb 19 for routine visit, was noted to have fever of 101, without any respiratory symptoms and he tested + for COVID. Today breathing was worse so EMS was called. Pt noted to be saturating in 30s on RA, improved to 90s on NRB but was unresponsive so was intubated in ED. Patient transferred to ICU - remained sedated/intubated, on pressor, +shock/MOF involving lungs/heart/kidneys/liver 2/2 COVID-19. No urine output. CT head noted multiple ischemic strokes likely secondary to hypercoagulable state, secondary to COVID-19 infection as per neuro. Family was notified for grave prognosis. GOC discussed with family and pt was made DNI/DNI and comfort measures with decision to do palliative extubation on 3/3/21. Pt was extubated on 3/3/21. Patient seen at bedside. On examination, patient was unresponsive, bilateral pupils dilated, non-responsive to light, no bilateral conjunctival reflex present, no heart sounds auscultated, no spontaneous breath sounds present, no peripheral pulses present. EKG showed asystole. Patient pronounced dead at 11:29 3/3/21. Attending, Dr. Ortega was informed regarding patient’s status. No family present at bedside. Dr. Saba informed patient’s family and condolences were offered    
6

## 2021-03-03 NOTE — PROGRESS NOTE ADULT - ASSESSMENT
· Assessment	  ASSESSMENT AND PLAN:  86 yo male with PMH of HTN, BPH, Dementia, CAD(Bypass in 2014) presented with SOB. Pt followed with PCP on Feb 19 for routine visit, was noted to have fever of 101, without any respiratory symptoms and he tested + for COVID. Today breathing was worse so EMS was called. Pt noted to be saturating in 30s on RA, improved to 90s on NRB but was unresponsive so was intubated in ED.     Assement:   - Acute Hypoxic respiratory failure due to COVID PNA, requiring mechanical ventilation  - septic shock  due to COVID and UTI   - Stroke   - KVNG   - Elevated D-dimer   - Hyperkalemia   -Transaminitis, elevated Bili   - Elevated Lactate   - Thrombocytopenia   - CAD   - HTN   - BPH   - Dementia     =================== Neuro============================  Alert and oriented x 0 on sedation, (Mental status base line AAO 3.)   Pt is on Propofol- will dc and start on dilaudid and versed pushes   Pt is made DNR/DNI with comfort measures and withdrawal of care. Scheduled for palliative extubation on 3/3/21    Stroke:   Acute multifocal ischemic infarct   CT head; Right frontal lobe, left occipital lobe, left cerebellar hemisphere and left basal ganglia lucency suspicious for acute infarcts.   home med: aspirin and Plavix on hold due to thrombocytopenia  Lipitor  40 mg on hold due to Elevated LFT  Dr Rangel consulted.   Dementia: home med: Aricept on hold for now.       ================= Cardiovascular==========================  Septic shock  Pt is made DNR/DNI with comfort measures and withdrawal of care. Scheduled for palliative extubation on 3/3/21    Troponemia   Trop of 111>90       CAD : s/p CABG in 2014.  Home med: Anti hypertensives coreg and Benicar on hold due to septic shock    ================- Pulm=================================  Acute Hypoxic respiratory failure due to COVID PNA, requiring mechanical ventilation  c/w [facility] vent   ABG showed ph 7.35, po2 of 341, PEEP 8 and Fio2 decreased from 100 to 60.  - dc'd Decadron IV 6mg daily   - Hold Remdesivir given elevated LFTs   Elevated D-dimer, most likely PE/DVT   - unable to get CT angio chest due to Acute renal failure   US doppler LE negative for DVT   - given acute ischemic stroke, and thrombocytopenia  will not start heparin drip for now.  - Risk of bleed over weigh the benefit of heparin drip  - Pt is made DNR/DNI with comfort measures and withdrawal of care. Scheduled for palliative extubation on 3/3/21      ==================ID===================================   Septic shock due to COVID and UTI   Afebrile, WBC: 15K, Lactate: 10>4, UA: positive.  Also, bilirubin noted to be 1.6, ALP/SGOT/SGPT: 99/1499/596   -dc'd Zosyn   - Pt is made DNR/DNI with comfort measures and withdrawal of care. Scheduled for palliative extubation on 3/3/21    ================= Nephro================================  - Acute renal failure   BUN/Creat: 91/5.83  Minimal urine output of 17ml overnight   HCO3 of 21Most likely pre renal given shock   - s/p 3.5 l Ns bolus in Ed   - Pt is made DNR/DNI with comfort measures and withdrawal of care. Scheduled for palliative extubation on 3/3/21    =================GI====================================  Transaminitis   Also, bilirubin noted to be 1.6, ALP/SGOT/SGPT: 99/1499/596   most likely due to shock   - Pt is made DNR/DNI with comfort measures and withdrawal of care. Scheduled for palliative extubation on 3/3/21      ================ Heme==================================  Thrombocytopenia   - Platelet: 50k>33k  D/d: COVID related, or other viral infection vs ITP vs TTP   - follow Hepatitis panel, ADAMTs   - Pt is made DNR/DNI with comfort measures and withdrawal of care. Scheduled for palliative extubation on 3/3/21     =================Endocrine===============================  No active issues   - Pt is made DNR/DNI with comfort measures and withdrawal of care. Scheduled for palliative extubation on 3/3/21    ================= Skin/Catheters============================  No rashes. Peripheral IV lines.   Central line RIJ     - =================Prophylaxis =============================  - Pt is made DNR/DNI with comfort measures and withdrawal of care. Scheduled for palliative extubation on 3/3/21    ==================GOC==================================  - Pt is made DNR/DNI with comfort measures and withdrawal of care. Scheduled for palliative extubation on 3/3/21

## 2021-03-03 NOTE — PROGRESS NOTE ADULT - SUBJECTIVE AND OBJECTIVE BOX
INTERVAL HPI/OVERNIGHT EVENTS: ***    PRESSORS: [ ] YES [ ] NO  WHICH:    ANTIBIOTICS:                  DATE STARTED:  ANTIBIOTICS:                  DATE STARTED:  ANTIBIOTICS:                  DATE STARTED:    Antimicrobial:  piperacillin/tazobactam IVPB.. 3.375 Gram(s) IV Intermittent every 12 hours    Cardiovascular:    Pulmonary:    Hematalogic:  heparin   Injectable 5000 Unit(s) SubCutaneous every 8 hours    Other:  chlorhexidine 0.12% Liquid 15 milliLiter(s) Oral Mucosa every 12 hours  chlorhexidine 2% Cloths 1 Application(s) Topical <User Schedule>  dexAMETHasone  Injectable 6 milliGRAM(s) IV Push daily  pantoprazole  Injectable 40 milliGRAM(s) IV Push daily  propofol Infusion 10.005 MICROgram(s)/kG/Min IV Continuous <Continuous>  sodium chloride 0.9% lock flush 10 milliLiter(s) IV Push every 1 hour PRN  sodium zirconium cyclosilicate 10 Gram(s) Oral every 8 hours    chlorhexidine 0.12% Liquid 15 milliLiter(s) Oral Mucosa every 12 hours  chlorhexidine 2% Cloths 1 Application(s) Topical <User Schedule>  dexAMETHasone  Injectable 6 milliGRAM(s) IV Push daily  heparin   Injectable 5000 Unit(s) SubCutaneous every 8 hours  pantoprazole  Injectable 40 milliGRAM(s) IV Push daily  piperacillin/tazobactam IVPB.. 3.375 Gram(s) IV Intermittent every 12 hours  propofol Infusion 10.005 MICROgram(s)/kG/Min IV Continuous <Continuous>  sodium chloride 0.9% lock flush 10 milliLiter(s) IV Push every 1 hour PRN  sodium zirconium cyclosilicate 10 Gram(s) Oral every 8 hours    Drug Dosing Weight  Height (cm): 172.7 (02 Mar 2021 11:00)  Weight (kg): 64.3 (01 Mar 2021 15:15)  BMI (kg/m2): 21.6 (02 Mar 2021 11:00)  BSA (m2): 1.77 (02 Mar 2021 11:00)    CENTRAL LINE: [ ] YES [ ] NO  LOCATION:   DATE INSERTED:    MATA: [ ] YES [ ] NO    DATE INSERTED:    A-LINE:  [ ] YES [ ] NO  LOCATION:   DATE INSERTED:    ICU Vital Signs Last 24 Hrs  T(C): 37.1 (03 Mar 2021 09:45), Max: 40.2 (03 Mar 2021 00:00)  T(F): 98.8 (03 Mar 2021 09:45), Max: 104.4 (03 Mar 2021 00:00)  HR: 94 (03 Mar 2021 09:45) (70 - 130)  BP: 103/58 (03 Mar 2021 09:45) (65/37 - 134/63)  BP(mean): 68 (03 Mar 2021 09:45) (38 - 82)  ABP: --  ABP(mean): --  RR: 20 (03 Mar 2021 09:45) (19 - 27)  SpO2: 100% (03 Mar 2021 09:45) (95% - 100%)      ABG - ( 02 Mar 2021 03:58 )  pH, Arterial: 7.35  pH, Blood: x     /  pCO2: 32    /  pO2: 341   / HCO3: 18    / Base Excess: -6.6  /  SaO2: 99                    03 @ 07:01  -  03-03 @ 07:00  --------------------------------------------------------  IN: 2012.6 mL / OUT: 15 mL / NET: 1997.6 mL        Mode: AC/ CMV (Assist Control/ Continuous Mandatory Ventilation)  RR (machine): 20  TV (machine): 450  FiO2: 60  PEEP: 8  ITime: 0.8  MAP: 13  PIP: 26      REVIEW OF SYSTEMS:    CONSTITUTIONAL: No weakness, fevers or chills  NECK: No pain or stiffnes  RESPIRATORY: ++ cough, +wheezing, + dyspnea,   CARDIOVASCULAR: No chest pain or palpitations  GASTROINTESTINAL: No abdominal or epigastric pain. No nausea, vomiting, No diarrhea or constipation. No melena or hematochezia.  GENITOURINARY: No dysuria, frequency or hematuria  NEUROLOGICAL: No numbness or weakness  All other review of systems is negative unless indicated above    PHYSICAL EXAM:    >>> <<<    LABS:  CBC Full  -  ( 02 Mar 2021 13:39 )  WBC Count : 18.69 K/uL  RBC Count : 4.27 M/uL  Hemoglobin : 13.4 g/dL  Hematocrit : 41.2 %  Platelet Count - Automated : 25 K/uL  Mean Cell Volume : 96.5 fl  Mean Cell Hemoglobin : 31.4 pg  Mean Cell Hemoglobin Concentration : 32.5 gm/dL  Auto Neutrophil # : x  Auto Lymphocyte # : x  Auto Monocyte # : x  Auto Eosinophil # : x  Auto Basophil # : x  Auto Neutrophil % : x  Auto Lymphocyte % : x  Auto Monocyte % : x  Auto Eosinophil % : x  Auto Basophil % : x        144  |  114<H>  |  97<H>  ----------------------------<  143<H>  5.5<H>   |  21<L>  |  6.25<H>    Ca    7.2<L>      02 Mar 2021 13:39  Phos  6.3       Mg     3.0         TPro  5.6<L>  /  Alb  1.7<L>  /  TBili  1.5<H>  /  DBili  x   /  AST  1013<H>  /  ALT  570<H>  /  AlkPhos  104  03-    PT/INR - ( 02 Mar 2021 06:35 )   PT: 15.1 sec;   INR: 1.28 ratio         PTT - ( 02 Mar 2021 06:35 )  PTT:32.6 sec  Urinalysis Basic - ( 01 Mar 2021 11:14 )    Color: Yellow / Appearance: Clear / S.015 / pH: x  Gluc: x / Ketone: Trace  / Bili: Negative / Urobili: 1   Blood: x / Protein: 100 / Nitrite: Positive   Leuk Esterase: Trace / RBC: 25-50 /HPF / WBC 3-5 /HPF   Sq Epi: x / Non Sq Epi: Few /HPF / Bacteria: Moderate /HPF      Culture Results:   No growth ( @ 19:10)  Culture Results:   No growth to date. ( @ 13:09)  Culture Results:   No growth to date. ( @ 13:09)      RADIOLOGY & ADDITIONAL STUDIES REVIEWED:  ***    [ ]GOALS OF CARE DISCUSSION WITH PATIENT/FAMILY/PROXY:    CRITICAL CARE TIME SPENT: 35 minutes INTERVAL HPI/OVERNIGHT EVENTS: Pt had no event overnight. pt had no further labs drawn     PRESSORS: [x ] YES [] NO  WHICH:          Cardiovascular:    Pulmonary:    Hematalogic:  heparin   Injectable 5000 Unit(s) SubCutaneous every 8 hours    Other:  chlorhexidine 0.12% Liquid 15 milliLiter(s) Oral Mucosa every 12 hours  chlorhexidine 2% Cloths 1 Application(s) Topical <User Schedule>  dexAMETHasone  Injectable 6 milliGRAM(s) IV Push daily  pantoprazole  Injectable 40 milliGRAM(s) IV Push daily  propofol Infusion 10.005 MICROgram(s)/kG/Min IV Continuous <Continuous>  sodium chloride 0.9% lock flush 10 milliLiter(s) IV Push every 1 hour PRN  sodium zirconium cyclosilicate 10 Gram(s) Oral every 8 hours    chlorhexidine 0.12% Liquid 15 milliLiter(s) Oral Mucosa every 12 hours  chlorhexidine 2% Cloths 1 Application(s) Topical <User Schedule>  dexAMETHasone  Injectable 6 milliGRAM(s) IV Push daily  heparin   Injectable 5000 Unit(s) SubCutaneous every 8 hours  pantoprazole  Injectable 40 milliGRAM(s) IV Push daily  piperacillin/tazobactam IVPB.. 3.375 Gram(s) IV Intermittent every 12 hours  propofol Infusion 10.005 MICROgram(s)/kG/Min IV Continuous <Continuous>  sodium chloride 0.9% lock flush 10 milliLiter(s) IV Push every 1 hour PRN  sodium zirconium cyclosilicate 10 Gram(s) Oral every 8 hours    Drug Dosing Weight  Height (cm): 172.7 (02 Mar 2021 11:00)  Weight (kg): 64.3 (01 Mar 2021 15:15)  BMI (kg/m2): 21.6 (02 Mar 2021 11:00)  BSA (m2): 1.77 (02 Mar 2021 11:00)    CENTRAL LINE: [ ] YES [ ] NO  LOCATION:   DATE INSERTED:    MATA: [ ] YES [ ] NO    DATE INSERTED:    A-LINE:  [ ] YES [ ] NO  LOCATION:   DATE INSERTED:    ICU Vital Signs Last 24 Hrs  T(C): 37.1 (03 Mar 2021 09:45), Max: 40.2 (03 Mar 2021 00:00)  T(F): 98.8 (03 Mar 2021 09:45), Max: 104.4 (03 Mar 2021 00:00)  HR: 94 (03 Mar 2021 09:45) (70 - 130)  BP: 103/58 (03 Mar 2021 09:45) (65/37 - 134/63)  BP(mean): 68 (03 Mar 2021 09:45) (38 - 82)  ABP: --  ABP(mean): --  RR: 20 (03 Mar 2021 09:45) (19 - 27)  SpO2: 100% (03 Mar 2021 09:45) (95% - 100%)      ABG - ( 02 Mar 2021 03:58 )  pH, Arterial: 7.35  pH, Blood: x     /  pCO2: 32    /  pO2: 341   / HCO3: 18    / Base Excess: -6.6  /  SaO2: 99                    - @ 07:01  -  - @ 07:00  --------------------------------------------------------  IN: 2012.6 mL / OUT: 15 mL / NET: 1997.6 mL        Mode: AC/ CMV (Assist Control/ Continuous Mandatory Ventilation)  RR (machine): 20  TV (machine): 450  FiO2: 60  PEEP: 8  ITime: 0.8  MAP: 13  PIP: 26      PHYSICAL EXAM:  GENERAL: pt in bed, intubated   HEENT: Normocephalic;  conjunctivae and sclerae clear; moist mucous membranes; Pupils are 3mm and sluggishly reactive  NECK : supple, RIJ in place, NGT in place   CHEST/LUNG: bilateral breath sounds present, on mechanical ventilation, bilateral basilar crackles present,   HEART: S1, S2 heard, no murmurs, gallops  ABDOMEN: Soft, Nontender, Nondistended; Bowel sounds present  EXTREMITIES: no cyanosis; no edema; no calf tenderness  Genitourinary Mata in place.  SKIN: Stage 2 sacral ulcer is present  NERVOUS SYSTEM:  AAO x0, sedated       LABS:  CBC Full  -  ( 02 Mar 2021 13:39 )  WBC Count : 18.69 K/uL  RBC Count : 4.27 M/uL  Hemoglobin : 13.4 g/dL  Hematocrit : 41.2 %  Platelet Count - Automated : 25 K/uL  Mean Cell Volume : 96.5 fl  Mean Cell Hemoglobin : 31.4 pg  Mean Cell Hemoglobin Concentration : 32.5 gm/dL  Auto Neutrophil # : x  Auto Lymphocyte # : x  Auto Monocyte # : x  Auto Eosinophil # : x  Auto Basophil # : x  Auto Neutrophil % : x  Auto Lymphocyte % : x  Auto Monocyte % : x  Auto Eosinophil % : x  Auto Basophil % : x    03    144  |  114<H>  |  97<H>  ----------------------------<  143<H>  5.5<H>   |  21<L>  |  6.25<H>    Ca    7.2<L>      02 Mar 2021 13:39  Phos  6.3       Mg     3.0     -    TPro  5.6<L>  /  Alb  1.7<L>  /  TBili  1.5<H>  /  DBili  x   /  AST  1013<H>  /  ALT  570<H>  /  AlkPhos  104  03-02    PT/INR - ( 02 Mar 2021 06:35 )   PT: 15.1 sec;   INR: 1.28 ratio         PTT - ( 02 Mar 2021 06:35 )  PTT:32.6 sec  Urinalysis Basic - ( 01 Mar 2021 11:14 )    Color: Yellow / Appearance: Clear / S.015 / pH: x  Gluc: x / Ketone: Trace  / Bili: Negative / Urobili: 1   Blood: x / Protein: 100 / Nitrite: Positive   Leuk Esterase: Trace / RBC: 25-50 /HPF / WBC 3-5 /HPF   Sq Epi: x / Non Sq Epi: Few /HPF / Bacteria: Moderate /HPF      Culture Results:   No growth ( @ 19:10)  Culture Results:   No growth to date. ( @ 13:09)  Culture Results:   No growth to date. ( @ 13:09)      RADIOLOGY & ADDITIONAL STUDIES REVIEWED:  ***    [ ]GOALS OF CARE DISCUSSION WITH PATIENT/FAMILY/PROXY:    CRITICAL CARE TIME SPENT: 35 minutes INTERVAL HPI/OVERNIGHT EVENTS: Pt had no event overnight. pt had no further labs drawn     PRESSORS: [x ] YES [] NO  WHICH:          Cardiovascular:    Pulmonary:    Hematalogic:  heparin   Injectable 5000 Unit(s) SubCutaneous every 8 hours    Other:  chlorhexidine 0.12% Liquid 15 milliLiter(s) Oral Mucosa every 12 hours  chlorhexidine 2% Cloths 1 Application(s) Topical <User Schedule>  dexAMETHasone  Injectable 6 milliGRAM(s) IV Push daily  pantoprazole  Injectable 40 milliGRAM(s) IV Push daily  propofol Infusion 10.005 MICROgram(s)/kG/Min IV Continuous <Continuous>  sodium chloride 0.9% lock flush 10 milliLiter(s) IV Push every 1 hour PRN  sodium zirconium cyclosilicate 10 Gram(s) Oral every 8 hours    chlorhexidine 0.12% Liquid 15 milliLiter(s) Oral Mucosa every 12 hours  chlorhexidine 2% Cloths 1 Application(s) Topical <User Schedule>  dexAMETHasone  Injectable 6 milliGRAM(s) IV Push daily  heparin   Injectable 5000 Unit(s) SubCutaneous every 8 hours  pantoprazole  Injectable 40 milliGRAM(s) IV Push daily  piperacillin/tazobactam IVPB.. 3.375 Gram(s) IV Intermittent every 12 hours  propofol Infusion 10.005 MICROgram(s)/kG/Min IV Continuous <Continuous>  sodium chloride 0.9% lock flush 10 milliLiter(s) IV Push every 1 hour PRN  sodium zirconium cyclosilicate 10 Gram(s) Oral every 8 hours    Drug Dosing Weight  Height (cm): 172.7 (02 Mar 2021 11:00)  Weight (kg): 64.3 (01 Mar 2021 15:15)  BMI (kg/m2): 21.6 (02 Mar 2021 11:00)  BSA (m2): 1.77 (02 Mar 2021 11:00)        ICU Vital Signs Last 24 Hrs  T(C): 37.1 (03 Mar 2021 09:45), Max: 40.2 (03 Mar 2021 00:00)  T(F): 98.8 (03 Mar 2021 09:45), Max: 104.4 (03 Mar 2021 00:00)  HR: 94 (03 Mar 2021 09:45) (70 - 130)  BP: 103/58 (03 Mar 2021 09:45) (65/37 - 134/63)  BP(mean): 68 (03 Mar 2021 09:45) (38 - 82)  ABP: --  ABP(mean): --  RR: 20 (03 Mar 2021 09:45) (19 - 27)  SpO2: 100% (03 Mar 2021 09:45) (95% - 100%)      ABG - ( 02 Mar 2021 03:58 )  pH, Arterial: 7.35  pH, Blood: x     /  pCO2: 32    /  pO2: 341   / HCO3: 18    / Base Excess: -6.6  /  SaO2: 99                    - @ 07:01  -  - @ 07:00  --------------------------------------------------------  IN: 2012.6 mL / OUT: 15 mL / NET: 1997.6 mL        Mode: AC/ CMV (Assist Control/ Continuous Mandatory Ventilation)  RR (machine): 20  TV (machine): 450  FiO2: 60  PEEP: 8  ITime: 0.8  MAP: 13  PIP: 26      PHYSICAL EXAM:  GENERAL: pt in bed, intubated   HEENT: Normocephalic;  conjunctivae and sclerae clear; moist mucous membranes; Pupils are 3mm and sluggishly reactive  NECK : supple, RIJ in place, NGT in place   CHEST/LUNG: bilateral breath sounds present, on mechanical ventilation, bilateral basilar crackles present,   HEART: S1, S2 heard, no murmurs, gallops  ABDOMEN: Soft, Nontender, Nondistended; Bowel sounds present  EXTREMITIES: no cyanosis; no edema; no calf tenderness  Genitourinary [place] in place.  SKIN: Stage 2 sacral ulcer is present  NERVOUS SYSTEM:  AAO x0, sedated       LABS:  CBC Full  -  ( 02 Mar 2021 13:39 )  WBC Count : 18.69 K/uL  RBC Count : 4.27 M/uL  Hemoglobin : 13.4 g/dL  Hematocrit : 41.2 %  Platelet Count - Automated : 25 K/uL  Mean Cell Volume : 96.5 fl  Mean Cell Hemoglobin : 31.4 pg  Mean Cell Hemoglobin Concentration : 32.5 gm/dL  Auto Neutrophil # : x  Auto Lymphocyte # : x  Auto Monocyte # : x  Auto Eosinophil # : x  Auto Basophil # : x  Auto Neutrophil % : x  Auto Lymphocyte % : x  Auto Monocyte % : x  Auto Eosinophil % : x  Auto Basophil % : x        144  |  114<H>  |  97<H>  ----------------------------<  143<H>  5.5<H>   |  21<L>  |  6.25<H>    Ca    7.2<L>      02 Mar 2021 13:39  Phos  6.3       Mg     3.0         TPro  5.6<L>  /  Alb  1.7<L>  /  TBili  1.5<H>  /  DBili  x   /  AST  1013<H>  /  ALT  570<H>  /  AlkPhos  104      PT/INR - ( 02 Mar 2021 06:35 )   PT: 15.1 sec;   INR: 1.28 ratio         PTT - ( 02 Mar 2021 06:35 )  PTT:32.6 sec  Urinalysis Basic - ( 01 Mar 2021 11:14 )    Color: Yellow / Appearance: Clear / S.015 / pH: x  Gluc: x / Ketone: Trace  / Bili: Negative / Urobili: 1   Blood: x / Protein: 100 / Nitrite: Positive   Leuk Esterase: Trace / RBC: 25-50 /HPF / WBC 3-5 /HPF   Sq Epi: x / Non Sq Epi: Few /HPF / Bacteria: Moderate /HPF      Culture Results:   No growth ( @ 19:10)  Culture Results:   No growth to date. ( @ 13:09)  Culture Results:   No growth to date. ( @ 13:09)      RADIOLOGY & ADDITIONAL STUDIES REVIEWED:  ***    [ ]GOALS OF CARE DISCUSSION WITH PATIENT/FAMILY/PROXY:    CRITICAL CARE TIME SPENT: 35 minutes

## 2021-03-05 LAB
ADAMTS13 ACTIVITY: 28.8 % — SIGNIFICANT CHANGE UP
ADAMTS13-COMMENT: SIGNIFICANT CHANGE UP
FIBRINOGEN AG PPP IA-MCNC: 157 MG/DL — SIGNIFICANT CHANGE UP
SARS-COV-2 IGG SERPL IA-ACNC: >20 RATIO — HIGH
SARS-COV-2 IGG SERPL QL IA: POSITIVE
SARS-COV-2 IGG SERPL QL IA: POSITIVE
SARS-COV-2 IGM SERPL IA-ACNC: 10.27 RATIO — HIGH
VWF CP INHIB PPP-ACNC: 11 UNITS/ML — SIGNIFICANT CHANGE UP

## 2021-03-06 LAB
CULTURE RESULTS: SIGNIFICANT CHANGE UP
CULTURE RESULTS: SIGNIFICANT CHANGE UP
SPECIMEN SOURCE: SIGNIFICANT CHANGE UP
SPECIMEN SOURCE: SIGNIFICANT CHANGE UP

## 2021-03-09 LAB
DEPRECATED S PNEUM 1 IGG SER-MCNC: 5.7 MCG/ML — SIGNIFICANT CHANGE UP
DEPRECATED S PNEUM12 IGG SER-MCNC: 0.4 MCG/ML — SIGNIFICANT CHANGE UP
DEPRECATED S PNEUM14 IGG SER-MCNC: 3 MCG/ML — SIGNIFICANT CHANGE UP
DEPRECATED S PNEUM17 IGG SER-MCNC: 12.1 MCG/ML — SIGNIFICANT CHANGE UP
DEPRECATED S PNEUM19 IGG SER-MCNC: 33.9 MCG/ML — SIGNIFICANT CHANGE UP
DEPRECATED S PNEUM19 IGG SER-MCNC: 4.1 MCG/ML — SIGNIFICANT CHANGE UP
DEPRECATED S PNEUM2 IGG SER-MCNC: 1.2 MCG/ML — SIGNIFICANT CHANGE UP
DEPRECATED S PNEUM20 IGG SER-MCNC: 10.4 MCG/ML — SIGNIFICANT CHANGE UP
DEPRECATED S PNEUM22 IGG SER-MCNC: 18.7 MCG/ML — SIGNIFICANT CHANGE UP
DEPRECATED S PNEUM23 IGG SER-MCNC: 18.7 MCG/ML — SIGNIFICANT CHANGE UP
DEPRECATED S PNEUM3 IGG SER-MCNC: 2 MCG/ML — SIGNIFICANT CHANGE UP
DEPRECATED S PNEUM4 IGG SER-MCNC: 1.7 MCG/ML — SIGNIFICANT CHANGE UP
DEPRECATED S PNEUM5 IGG SER-MCNC: 24.6 MCG/ML — SIGNIFICANT CHANGE UP
DEPRECATED S PNEUM8 IGG SER-MCNC: 3.7 MCG/ML — SIGNIFICANT CHANGE UP
DEPRECATED S PNEUM9 IGG SER-MCNC: 6 MCG/ML — SIGNIFICANT CHANGE UP
DEPRECATED S PNEUM9 IGG SER-MCNC: SIGNIFICANT CHANGE UP
S PNEUM SEROTYPE IGG SER-IMP: 2.8 MCG/ML — SIGNIFICANT CHANGE UP
S PNEUM SEROTYPE IGG SER-IMP: 4.4 MCG/ML — SIGNIFICANT CHANGE UP
S PNEUM SEROTYPE IGG SER-IMP: 40.3 MCG/ML — SIGNIFICANT CHANGE UP
S PNEUM SEROTYPE IGG SER-IMP: 5.2 MCG/ML — SIGNIFICANT CHANGE UP
S PNEUM SEROTYPE IGG SER-IMP: 6.6 MCG/ML — SIGNIFICANT CHANGE UP
S PNEUM SEROTYPE IGG SER-IMP: 8.1 MCG/ML — SIGNIFICANT CHANGE UP
S PNEUM SEROTYPE IGG SER-IMP: 8.2 MCG/ML — SIGNIFICANT CHANGE UP

## 2021-03-16 PROCEDURE — 86769 SARS-COV-2 COVID-19 ANTIBODY: CPT

## 2021-03-16 PROCEDURE — 86901 BLOOD TYPING SEROLOGIC RH(D): CPT

## 2021-03-16 PROCEDURE — 86703 HIV-1/HIV-2 1 RESULT ANTBDY: CPT

## 2021-03-16 PROCEDURE — 76705 ECHO EXAM OF ABDOMEN: CPT

## 2021-03-16 PROCEDURE — 83605 ASSAY OF LACTIC ACID: CPT

## 2021-03-16 PROCEDURE — 82728 ASSAY OF FERRITIN: CPT

## 2021-03-16 PROCEDURE — 85385 FIBRINOGEN ANTIGEN: CPT

## 2021-03-16 PROCEDURE — 85379 FIBRIN DEGRADATION QUANT: CPT

## 2021-03-16 PROCEDURE — 87040 BLOOD CULTURE FOR BACTERIA: CPT

## 2021-03-16 PROCEDURE — 71250 CT THORAX DX C-: CPT

## 2021-03-16 PROCEDURE — 93005 ELECTROCARDIOGRAM TRACING: CPT

## 2021-03-16 PROCEDURE — 82247 BILIRUBIN TOTAL: CPT

## 2021-03-16 PROCEDURE — 82962 GLUCOSE BLOOD TEST: CPT

## 2021-03-16 PROCEDURE — 84300 ASSAY OF URINE SODIUM: CPT

## 2021-03-16 PROCEDURE — 85730 THROMBOPLASTIN TIME PARTIAL: CPT

## 2021-03-16 PROCEDURE — 86900 BLOOD TYPING SEROLOGIC ABO: CPT

## 2021-03-16 PROCEDURE — 82570 ASSAY OF URINE CREATININE: CPT

## 2021-03-16 PROCEDURE — 86581 STRPTCS PNEUM ANTB SEROT IA: CPT

## 2021-03-16 PROCEDURE — 94003 VENT MGMT INPAT SUBQ DAY: CPT

## 2021-03-16 PROCEDURE — 86317 IMMUNOASSAY INFECTIOUS AGENT: CPT

## 2021-03-16 PROCEDURE — 84145 PROCALCITONIN (PCT): CPT

## 2021-03-16 PROCEDURE — 87635 SARS-COV-2 COVID-19 AMP PRB: CPT

## 2021-03-16 PROCEDURE — 82248 BILIRUBIN DIRECT: CPT

## 2021-03-16 PROCEDURE — 85027 COMPLETE CBC AUTOMATED: CPT

## 2021-03-16 PROCEDURE — 87640 STAPH A DNA AMP PROBE: CPT

## 2021-03-16 PROCEDURE — U0005: CPT

## 2021-03-16 PROCEDURE — 83036 HEMOGLOBIN GLYCOSYLATED A1C: CPT

## 2021-03-16 PROCEDURE — 94760 N-INVAS EAR/PLS OXIMETRY 1: CPT

## 2021-03-16 PROCEDURE — 80053 COMPREHEN METABOLIC PANEL: CPT

## 2021-03-16 PROCEDURE — 86850 RBC ANTIBODY SCREEN: CPT

## 2021-03-16 PROCEDURE — 36415 COLL VENOUS BLD VENIPUNCTURE: CPT

## 2021-03-16 PROCEDURE — 82803 BLOOD GASES ANY COMBINATION: CPT

## 2021-03-16 PROCEDURE — 93970 EXTREMITY STUDY: CPT

## 2021-03-16 PROCEDURE — 85025 COMPLETE CBC W/AUTO DIFF WBC: CPT

## 2021-03-16 PROCEDURE — 87641 MR-STAPH DNA AMP PROBE: CPT

## 2021-03-16 PROCEDURE — 85362 FIBRIN DEGRADATION PRODUCTS: CPT

## 2021-03-16 PROCEDURE — 86140 C-REACTIVE PROTEIN: CPT

## 2021-03-16 PROCEDURE — 80061 LIPID PANEL: CPT

## 2021-03-16 PROCEDURE — 83735 ASSAY OF MAGNESIUM: CPT

## 2021-03-16 PROCEDURE — 31500 INSERT EMERGENCY AIRWAY: CPT

## 2021-03-16 PROCEDURE — 70450 CT HEAD/BRAIN W/O DYE: CPT

## 2021-03-16 PROCEDURE — 99291 CRITICAL CARE FIRST HOUR: CPT

## 2021-03-16 PROCEDURE — 82550 ASSAY OF CK (CPK): CPT

## 2021-03-16 PROCEDURE — 85384 FIBRINOGEN ACTIVITY: CPT

## 2021-03-16 PROCEDURE — 96375 TX/PRO/DX INJ NEW DRUG ADDON: CPT | Mod: XU

## 2021-03-16 PROCEDURE — 85610 PROTHROMBIN TIME: CPT

## 2021-03-16 PROCEDURE — 0225U NFCT DS DNA&RNA 21 SARSCOV2: CPT

## 2021-03-16 PROCEDURE — 96374 THER/PROPH/DIAG INJ IV PUSH: CPT | Mod: XU

## 2021-03-16 PROCEDURE — 84156 ASSAY OF PROTEIN URINE: CPT

## 2021-03-16 PROCEDURE — 84484 ASSAY OF TROPONIN QUANT: CPT

## 2021-03-16 PROCEDURE — 71045 X-RAY EXAM CHEST 1 VIEW: CPT

## 2021-03-16 PROCEDURE — 85397 CLOTTING FUNCT ACTIVITY: CPT

## 2021-03-16 PROCEDURE — 87086 URINE CULTURE/COLONY COUNT: CPT

## 2021-03-16 PROCEDURE — 80074 ACUTE HEPATITIS PANEL: CPT

## 2021-03-16 PROCEDURE — 94640 AIRWAY INHALATION TREATMENT: CPT

## 2021-03-16 PROCEDURE — 94002 VENT MGMT INPAT INIT DAY: CPT

## 2021-03-16 PROCEDURE — 82306 VITAMIN D 25 HYDROXY: CPT

## 2021-03-16 PROCEDURE — 81001 URINALYSIS AUTO W/SCOPE: CPT

## 2021-03-16 PROCEDURE — 83935 ASSAY OF URINE OSMOLALITY: CPT

## 2021-03-16 PROCEDURE — 82746 ASSAY OF FOLIC ACID SERUM: CPT

## 2021-03-16 PROCEDURE — 84100 ASSAY OF PHOSPHORUS: CPT

## 2021-03-16 PROCEDURE — 82607 VITAMIN B-12: CPT

## 2021-03-16 PROCEDURE — 82553 CREATINE MB FRACTION: CPT

## 2023-10-10 NOTE — ED ADULT NURSE NOTE - SUICIDE SCREENING QUESTION 3
Called patient because he missed his appointment for today. Called all numbers on file but no answer.  
Patient unable to complete

## 2024-06-18 NOTE — PROCEDURE NOTE - PROCEDURE DATE TIME, MLM
01-Mar-2021 16:37